# Patient Record
Sex: MALE | Race: WHITE | Employment: UNEMPLOYED | URBAN - METROPOLITAN AREA
[De-identification: names, ages, dates, MRNs, and addresses within clinical notes are randomized per-mention and may not be internally consistent; named-entity substitution may affect disease eponyms.]

---

## 2021-11-22 NOTE — PROGRESS NOTES
[facility] ([facility]) Joint Replacement Pre-surgical Assessment    Scheduled Surgery Date: 12/02/2021  Surgery Time: 0730    Surgeon: HEIDE  Procedure: left Total Hip    Primary Insurance Coverage PARAMOUNT ADVANTAGE  Pre-op class attended YES 11/18/2021    PCP: Avery Bolaños MD  Clearance received by PCP: YES    Anticipated Discharge Plan: home  Agency (if applicable): HHC?     Significant PMH:   Surgical History    Procedure Laterality Date Comment Source   CARDIAC SURGERY  07/05/2015 Loop Recorder Implant    EXTERNAL AUDITORY CANAL RECONSTRUCTION       FACIAL COSMETIC SURGERY       KNEE SURGERY       WISDOM TOOTH EXTRACTION           ED Notes    ED Notes      Medical History    Diagnosis Date Comment Source   Arthritis  exercise induced    Dizzy      Infection of knee end of June 2015 left knee treated with IV antibiotics    Syncope      Vasovagal syndrome  per tilt test             Smoking history: none    Alcohol history: Never drinks    Concerns prior to surgery: [address]    Electronically signed by: Darron Morse RN on 11/22/2021 at 11:12 AM

## 2021-11-26 ENCOUNTER — HOSPITAL ENCOUNTER (OUTPATIENT)
Dept: PREADMISSION TESTING | Age: 56
Discharge: HOME | End: 2021-11-30
Payer: MEDICARE

## 2021-11-26 VITALS
BODY MASS INDEX: 32.92 KG/M2 | WEIGHT: 222.3 LBS | HEIGHT: 69 IN | DIASTOLIC BLOOD PRESSURE: 88 MMHG | SYSTOLIC BLOOD PRESSURE: 150 MMHG | RESPIRATION RATE: 16 BRPM | TEMPERATURE: 97.1 F | OXYGEN SATURATION: 96 %

## 2021-11-26 LAB
ABO/RH: NORMAL
ANION GAP SERPL CALCULATED.3IONS-SCNC: 14 MMOL/L (ref 9–17)
ANTIBODY SCREEN: NEGATIVE
ARM BAND NUMBER: NORMAL
BUN BLDV-MCNC: 17 MG/DL (ref 6–20)
BUN/CREAT BLD: 19 (ref 9–20)
CALCIUM SERPL-MCNC: 9.2 MG/DL (ref 8.6–10.4)
CHLORIDE BLD-SCNC: 102 MMOL/L (ref 98–107)
CO2: 22 MMOL/L (ref 20–31)
CREAT SERPL-MCNC: 0.91 MG/DL (ref 0.7–1.2)
EXPIRATION DATE: NORMAL
GFR AFRICAN AMERICAN: >60 ML/MIN
GFR NON-AFRICAN AMERICAN: >60 ML/MIN
GFR SERPL CREATININE-BSD FRML MDRD: ABNORMAL ML/MIN/{1.73_M2}
GFR SERPL CREATININE-BSD FRML MDRD: ABNORMAL ML/MIN/{1.73_M2}
GLUCOSE BLD-MCNC: 114 MG/DL (ref 70–99)
HCT VFR BLD CALC: 38.7 % (ref 40.7–50.3)
HEMOGLOBIN: 12.9 G/DL (ref 13–17)
MCH RBC QN AUTO: 28.7 PG (ref 25.2–33.5)
MCHC RBC AUTO-ENTMCNC: 33.3 G/DL (ref 28.4–34.8)
MCV RBC AUTO: 86 FL (ref 82.6–102.9)
NRBC AUTOMATED: 0 PER 100 WBC
PDW BLD-RTO: 12.6 % (ref 11.8–14.4)
PLATELET # BLD: 184 K/UL (ref 138–453)
PMV BLD AUTO: 9.2 FL (ref 8.1–13.5)
POTASSIUM SERPL-SCNC: 4.3 MMOL/L (ref 3.7–5.3)
RBC # BLD: 4.5 M/UL (ref 4.21–5.77)
SODIUM BLD-SCNC: 138 MMOL/L (ref 135–144)
WBC # BLD: 5 K/UL (ref 3.5–11.3)

## 2021-11-26 PROCEDURE — 87641 MR-STAPH DNA AMP PROBE: CPT

## 2021-11-26 PROCEDURE — 36415 COLL VENOUS BLD VENIPUNCTURE: CPT

## 2021-11-26 PROCEDURE — 80048 BASIC METABOLIC PNL TOTAL CA: CPT

## 2021-11-26 PROCEDURE — 85027 COMPLETE CBC AUTOMATED: CPT

## 2021-11-26 PROCEDURE — 86850 RBC ANTIBODY SCREEN: CPT

## 2021-11-26 PROCEDURE — 93005 ELECTROCARDIOGRAM TRACING: CPT | Performed by: ANESTHESIOLOGY

## 2021-11-26 PROCEDURE — 86901 BLOOD TYPING SEROLOGIC RH(D): CPT

## 2021-11-26 PROCEDURE — 86900 BLOOD TYPING SEROLOGIC ABO: CPT

## 2021-11-26 RX ORDER — LISINOPRIL AND HYDROCHLOROTHIAZIDE 20; 12.5 MG/1; MG/1
2 TABLET ORAL DAILY
COMMUNITY
Start: 2021-11-10

## 2021-11-26 RX ORDER — ASPIRIN 81 MG/1
81 TABLET, COATED ORAL DAILY
Status: ON HOLD | COMMUNITY
Start: 2021-10-20 | End: 2021-12-02 | Stop reason: HOSPADM

## 2021-11-26 RX ORDER — SCOLOPAMINE TRANSDERMAL SYSTEM 1 MG/1
1 PATCH, EXTENDED RELEASE TRANSDERMAL ONCE
Status: CANCELLED | OUTPATIENT
Start: 2021-12-02

## 2021-11-26 RX ORDER — GEMFIBROZIL 600 MG/1
600 TABLET, FILM COATED ORAL DAILY
COMMUNITY
Start: 2021-10-20

## 2021-11-26 RX ORDER — MELOXICAM 15 MG/1
15 TABLET ORAL DAILY
Status: ON HOLD | COMMUNITY
Start: 2021-11-13 | End: 2021-12-02 | Stop reason: HOSPADM

## 2021-11-26 RX ORDER — DEXAMETHASONE SODIUM PHOSPHATE 10 MG/ML
10 INJECTION, SOLUTION INTRAMUSCULAR; INTRAVENOUS ONCE
Status: CANCELLED | OUTPATIENT
Start: 2021-12-02

## 2021-11-26 ASSESSMENT — HOOS JR
WALKING ON UNEVEN SURFACE: 2
SITTING: 2
HOOS JR RAW SCORE: 14
GOING UP OR DOWN STAIRS: 3
LYING IN BED (TURNING OVER, MAINTAINING HIP POSITION): 3
BENDING TO THE FLOOR TO PICK UP OBJECT: 2
RISING FROM SITTING: 2

## 2021-11-26 ASSESSMENT — PROMIS GLOBAL HEALTH SCALE
IN THE PAST 7 DAYS, HOW WOULD YOU RATE YOUR PAIN ON AVERAGE [ON A SCALE FROM 0 (NO PAIN) TO 10 (WORST IMAGINABLE PAIN)]?: 6
SUM OF RESPONSES TO QUESTIONS 2, 4, 5, & 10: 13
IN GENERAL, HOW WOULD YOU RATE YOUR PHYSICAL HEALTH [ON A SCALE OF 1 (POOR) TO 5 (EXCELLENT)]?: 3
IN GENERAL, WOULD YOU SAY YOUR HEALTH IS...[ON A SCALE OF 1 (POOR) TO 5 (EXCELLENT)]: 3
WHO IS THE PERSON COMPLETING THE PROMIS V1.1 SURVEY?: 0
IN THE PAST 7 DAYS, HOW OFTEN HAVE YOU BEEN BOTHERED BY EMOTIONAL PROBLEMS, SUCH AS FEELING ANXIOUS, DEPRESSED, OR IRRITABLE [ON A SCALE FROM 1 (NEVER) TO 5 (ALWAYS)]?: 2
TO WHAT EXTENT ARE YOU ABLE TO CARRY OUT YOUR EVERYDAY PHYSICAL ACTIVITIES SUCH AS WALKING, CLIMBING STAIRS, CARRYING GROCERIES, OR MOVING A CHAIR [ON A SCALE OF 1 (NOT AT ALL) TO 5 (COMPLETELY)]?: 3
SUM OF RESPONSES TO QUESTIONS 3, 6, 7, & 8: 15
HOW IS THE PROMIS V1.1 BEING ADMINISTERED?: 0
IN GENERAL, PLEASE RATE HOW WELL YOU CARRY OUT YOUR USUAL SOCIAL ACTIVITIES (INCLUDES ACTIVITIES AT HOME, AT WORK, AND IN YOUR COMMUNITY, AND RESPONSIBILITIES AS A PARENT, CHILD, SPOUSE, EMPLOYEE, FRIEND, ETC) [ON A SCALE OF 1 (POOR) TO 5 (EXCELLENT)]?: 4
IN THE PAST 7 DAYS, HOW WOULD YOU RATE YOUR FATIGUE ON AVERAGE [ON A SCALE FROM 1 (NONE) TO 5 (VERY SEVERE)]?: 3
IN GENERAL, HOW WOULD YOU RATE YOUR SATISFACTION WITH YOUR SOCIAL ACTIVITIES AND RELATIONSHIPS [ON A SCALE OF 1 (POOR) TO 5 (EXCELLENT)]?: 4
IN GENERAL, WOULD YOU SAY YOUR QUALITY OF LIFE IS...[ON A SCALE OF 1 (POOR) TO 5 (EXCELLENT)]: 3
IN GENERAL, HOW WOULD YOU RATE YOUR MENTAL HEALTH, INCLUDING YOUR MOOD AND YOUR ABILITY TO THINK [ON A SCALE OF 1 (POOR) TO 5 (EXCELLENT)]?: 4

## 2021-11-26 ASSESSMENT — PAIN DESCRIPTION - LOCATION: LOCATION: HIP

## 2021-11-26 ASSESSMENT — PAIN DESCRIPTION - ORIENTATION: ORIENTATION: LEFT

## 2021-11-26 ASSESSMENT — PAIN DESCRIPTION - DESCRIPTORS: DESCRIPTORS: CONSTANT;DISCOMFORT

## 2021-11-26 ASSESSMENT — PAIN SCALES - GENERAL: PAINLEVEL_OUTOF10: 5

## 2021-11-26 ASSESSMENT — PAIN DESCRIPTION - PAIN TYPE: TYPE: CHRONIC PAIN

## 2021-11-26 NOTE — H&P
History and Physical Service   [facility]    HISTORY AND PHYSICAL EXAMINATION            Date of Evaluation: 11/26/2021  Patient name:  Ro Subramanian  MRN:   [id number]  YOB: 1965  PCP:    Tiffanie Rendon MD    History Obtained From:     Patient    History of Present Illness: This is Ro Subramanian a 60 y.o. male who presents for a pre-admission testing appointment for an upcoming left total hip arthroplasty anterior approach by Dr Sheila Garcia for left hip OA scheduled on 12/2/21 @ 0730. The patient's chief complaint is 5+/10 constant aching left hip pain which has progressively worsened over the past several years. His pain is aggravated by standing, walking, and climbing stairs. Follows with Dr Kate Chaves. He had three left hip injections over a year that provided only temporary relief and the last  on 8/20/21 provided no relief at all. Pain now radiating to his left groin and is minimally relieved with Mobic. When last seen on 9/29/21, Dr Kate Chaves reviewed the Left Hip MRI again from 1/31/20 that showed \"bilateral hip OA moderate, L> R. Has a tear of left acetabular labrum is suspected with posterior lateral paralabral cyst.\"   Dr Kate Chaves felt the patient has failed prior conservative treatment including PT and recommended surgical intervention. Denies recent falls and injuries. Hx HTN HLD on medication \"Vasodpressor syncope\" Followed with Dr Felecia Hazel Had a loop recorder implanted. \"When I had my f/u it was going to remove it but they decided to keep it in place\" Hasn't seen cardiology for 1-2 years ago. Sleep apnea questionnaire  Never had a sleep study but has CPAP available. Didn't like using it    1) Do you snore loudly? Yes  2) Do you often feel tired, fatigued, or sleepy? sometimes  3) Has anyone observed you stop breathing or choking/gasping during your sleep? Yes   4) Do you have hypertension? Yes  5) BMI >35 kg/m2? No  6) Age > 50? Yes  7) Pt is a male? Yes    Functional Status per patient:  Can perform these activities but due to hip pain must go slowly and doesn't have any symptoms   1) Patient is able to walk 2 blocks or more on level ground without SOB. 2) Patient is able to climb 1 flight of stairs slowly or more without stopping. 3) Patient is able to walk slowly up a hill 1 block or more. Past Medical History:     Past Medical History:   Diagnosis Date    Arthritis     exercise induced    Asthma     exercise induced asthma    COVID-19 11/2021    Dizzy     Hyperlipidemia     Hypertension     Infection of knee end of June 2015    left knee treated with IV antibiotics    Shingles     2019    Syncope     Vasovagal syndrome     per tilt test        Past Surgical History:     Past Surgical History:   Procedure Laterality Date    CARDIAC SURGERY  07/05/2015    Loop Recorder Implant    EXTERNAL AUDITORY CANAL RECONSTRUCTION      FACIAL COSMETIC SURGERY      Pt denies    KNEE SURGERY      WISDOM TOOTH EXTRACTION          Medications Prior to Admission:     Prior to Admission medications    Medication Sig Start Date End Date Taking? Authorizing Provider   citalopram (CELEXA) 20 MG tablet Take 20 mg by mouth daily   Yes Historical Provider, MD   ASPIRIN LOW DOSE 81 MG EC tablet Take 81 mg by mouth daily 10/20/21   Historical Provider, MD   gemfibrozil (LOPID) 600 MG tablet Take 600 mg by mouth daily 10/20/21   Historical Provider, MD   lisinopril-hydroCHLOROthiazide (PRINZIDE;ZESTORETIC) 20-12.5 MG per tablet Take 2 tablets by mouth daily 11/10/21   Historical Provider, MD   meloxicam (MOBIC) 15 MG tablet Take 15 mg by mouth daily 11/13/21   Historical Provider, MD   loratadine (CLARITIN) 10 MG tablet Take 10 mg by mouth daily as needed    Historical Provider, MD        Allergies:     Patient has no known allergies. Social History:     Tobacco:    reports that he has quit smoking. His smoking use included cigars.  He has never used smokeless tobacco.  Alcohol:      reports current alcohol use. Drug Use:  reports no history of drug use. Family History:     Family History   Problem Relation Age of Onset    Coronary Art Dis Mother     Cancer Father         Lung cancer       Review of Systems:     Positive and Negative as described in HPI. CONSTITUTIONAL: intentional 30# weight loss Negative for fevers, chills, sweats, fatigue  HEENT:  Negative for glasses, hearing changes, rhinorrhea, and throat pain. RESPIRATORY: Hx exercise induced asthma No use of inhalers Negative for shortness of breath, cough, congestion, and wheezing. CARDIOVASCULAR: Hx HTN HLD on medication \"Vasodpressor syncope\" Followed with Dr Sesay Monday Had a loop recorder implanted. \"When had my f/u it was going to remove it but they decided to keep it in place\" Hasn't seen cardiology for 1-2 years ago. Negative for chest pain, blood clot, irregular heartbeat, and palpitations. GASTROINTESTINAL:  Negative for reflux, nausea, vomiting, diarrhea, constipation, change in bowel habits, and abdominal pain. GENITOURINARY:  Negative for difficulty of urination, burning with urination, and frequency. INTEGUMENT:  Negative for rash, skin lesions, and easy bruising. Instructed pt to call PCP as soon as possible if a rash or wound develops prior to surgery. Pt voiced understanding. HEMATOLOGIC/LYMPHATIC:  Negative for swelling/edema. ALLERGIC/IMMUNOLOGIC:  Negative for urticaria and itching. ENDOCRINE:  Negative for increase in thirst, increase in urination, and heat or cold intolerance. MUSCULOSKELETAL: See HPI. NEUROLOGICAL:  Negative for headaches, dizziness, lightheadedness, numbness, and tingling extremities. BEHAVIOR/PSYCH:  Negative for depression and anxiety.     Physical Exam:   BP (!) 150/88   Temp 97.1 °F (36.2 °C) (Temporal)   Resp 16   Ht 5' 9\" (1.753 m)   Wt 222 lb 4.8 oz (100.8 kg)   SpO2 96%   BMI 32.83 kg/m²      General Appearance:  Alert, well appearing, and GFR Comment          GFR Staging NOT REPORTED    TYPE AND SCREEN    Collection Time: 11/26/21 10:26 AM   Result Value Ref Range    Expiration Date 12/05/2021,2359     Arm Band Number [id number]     ABO/Rh B POSITIVE     Antibody Screen NEGATIVE        Recent Labs     11/26/21  1024   HGB 12.9*   HCT 38.7*   WBC 5.0   MCV 86.0      K 4.3      CO2 22   BUN 17   CREATININE 0.91   GLUCOSE 114*       No results for input(s): COVID19 in the last 720 hours. Imaging/Diagnostics:    No results found. EKG: See Epic. Currently showed NSR with inferior infarct age undetermined    Diagnosis:      1. Left hip OA    Plans:     1.  Left total hip arthroplasty anterior approach      THI Lucero - KIMBER  11/26/2021  2:28 PM

## 2021-11-26 NOTE — PROGRESS NOTES
[facility] at 0530 AM    Once you enter the hospital lobby, take the elevators to the second floor. Check-In is at the surgery registration desk. Continue to take your home medications as you normally do up to and including the night before surgery with the exception of any blood thinning medications. Please stop any blood thinning medications as directed by your surgeon or prescribing physician. Failure to stop certain medications may interfere with your scheduled surgery. These may include:  Aspirin, Warfarin (Coumadin), Clopidogrel (Plavix), Ibuprofen (Motrin, Advil), Naproxen (Aleve), Meloxicam (Mobic), Celecoxib (Celebrex), Eliquis, Pradaxa, Xarelto, Effient, Fish Oil, Herbal supplements. Do not take Aspirin or Mobic 7 days prior to surgery. Ok to take Tylenol    Please take the following medication(s) the day of surgery with a small sip of water:  Celexa    PREPARING FOR YOUR SURGERY:     Before surgery, you can play an important role in your own health. Because skin is not sterile, we need to be sure that your skin is as free of germs as possible before surgery by carefully washing before surgery. Preparing or prepping skin before surgery can reduce the risk of a surgical site infection.   Do not shave the area of your body where your surgery will be performed unless you received specific permission from your physician. You will need to shower at home the night before surgery and the morning of surgery with a special soap called chlorhexidine gluconate (CHG*). *Not to be used by people allergic to Chlorhexidine Gluconate (CHG). Following these instructions will help you be sure that your skin is clean before surgery. Instructions on cleaning your skin before surgery: The night before your surgery:      You will need to shower with warm water (not hot) and the CHG soap.  Use a clean wash cloth and a clean towel.   Have clean clothes available to put on after the shower.   First wash your hair with regular shampoo. Rinse your hair and body thoroughly to remove the shampoo.  Wash your face and genital area (private parts) with your regular soap or water only. Thoroughly rinse your body with warm water from the neck down.  Turn water off to prevent rinsing the soap off too soon.  With a clean wet washcloth and half of the CHG soap in the bottle, lather your entire body from the neck down. Do not use CHG soap near your eyes or ears to avoid injury to those areas.  Wash thoroughly, paying special attention to the area where your surgery will be performed.  Wash your body gently for five (5) minutes. Avoid scrubbing your skin too hard.  Turn the water back on and rinse your body thoroughly.  Pat yourself dry with a clean, soft towel. Do not apply lotion, cream or powder.  Dress with clean freshly washed clothes. The morning of surgery:     Repeat shower following steps above - using remaining half of CHG soap in bottle. Patient Instructions:    Annabella Heller If you are having any type of anesthesia you are to have nothing to eat or drink after midnight the night before your surgery. This includes gum, hard candy, mints, water or smoking or chewing tobacco.  The only exception to this is a small sip of water to take with any morning dose of heart, blood pressure, or seizure medications. No alcoholic beverages for 24 hours prior to surgery.  Brush your teeth but do not swallow water.  Bring your eye glasses and case with you. No contacts are to be worn the day of surgery. You also may bring your hearing aids. Most surgical procedures involving anesthesia will require that you remove your dentures prior to surgery.  If you are on C-PAP or Bi-PAP at home and plan on staying in the hospital overnight for your surgery please bring the machine with you.     · Do not wear any jewelry or body piercings day of surgery. Also, NO lotion, perfume or deodorant to be used the day of surgery. No nail polish on the operative extremity (arm/leg surgeries)    · If you are staying overnight with us, please bring a small bag of necessary personal items.  Please wear loose, comfortable clothing. If you are potentially going to have a cast or brace bring clothing that will fit over them.  In case of illness - If you have cold or flu like symptoms (high fever, runny nose, sore throat, cough, etc.) rash, nausea, vomiting, loose stools, and/or recent contact with someone who has a contagious disease (chicken pox, measles, etc.) Please call your doctor before coming to the hospital.         Day of Surgery/Procedure:    As a patient at [facility] you can expect quality medical and nursing care that is centered on your individual needs. Our goal is to make your surgical experience as comfortable as possible    . Transportation After Your Surgery/Procedure: You will need a friend or family member to drive you home after your procedure. Your  must be 25years of age or older and able to sign off on your discharge instructions. A taxi cab or any other form of public transportation is not acceptable. Your friend or family member must stay at the hospital throughout your procedure. Someone must remain with you for the first 24 hours after your surgery if you receive anesthesia or medication. If you do not have someone to stay with you, your procedure may be cancelled.       If you have any other questions regarding your procedure or the day of surgery, please call [phone]      _________________________  ____________________________  Signature (patient)                                       Signature (provider)             Date

## 2021-11-27 LAB
EKG ATRIAL RATE: 67 BPM
EKG P AXIS: 56 DEGREES
EKG P-R INTERVAL: 156 MS
EKG Q-T INTERVAL: 396 MS
EKG QRS DURATION: 76 MS
EKG QTC CALCULATION (BAZETT): 418 MS
EKG R AXIS: -10 DEGREES
EKG T AXIS: 36 DEGREES
EKG VENTRICULAR RATE: 67 BPM
MRSA, DNA, NASAL: NORMAL
SPECIMEN DESCRIPTION: NORMAL

## 2021-11-27 PROCEDURE — 93010 ELECTROCARDIOGRAM REPORT: CPT | Performed by: INTERNAL MEDICINE

## 2021-12-01 ENCOUNTER — ANESTHESIA EVENT (OUTPATIENT)
Dept: OPERATING ROOM | Age: 56
End: 2021-12-01
Payer: MEDICARE

## 2021-12-02 ENCOUNTER — HOSPITAL ENCOUNTER (OUTPATIENT)
Age: 56
Discharge: HOME | End: 2021-12-03
Attending: ORTHOPAEDIC SURGERY | Admitting: ORTHOPAEDIC SURGERY
Payer: MEDICARE

## 2021-12-02 ENCOUNTER — APPOINTMENT (OUTPATIENT)
Dept: GENERAL RADIOLOGY | Age: 56
End: 2021-12-02
Attending: ORTHOPAEDIC SURGERY
Payer: MEDICARE

## 2021-12-02 ENCOUNTER — ANESTHESIA (OUTPATIENT)
Dept: OPERATING ROOM | Age: 56
End: 2021-12-02
Payer: MEDICARE

## 2021-12-02 VITALS — SYSTOLIC BLOOD PRESSURE: 104 MMHG | TEMPERATURE: 98.8 F | OXYGEN SATURATION: 99 % | DIASTOLIC BLOOD PRESSURE: 52 MMHG

## 2021-12-02 DIAGNOSIS — G89.18 ACUTE POSTOPERATIVE PAIN: Primary | ICD-10-CM

## 2021-12-02 PROBLEM — M16.12 PRIMARY LOCALIZED OSTEOARTHRITIS OF LEFT HIP: Status: ACTIVE | Noted: 2021-12-02

## 2021-12-02 LAB — GLUCOSE BLD-MCNC: 173 MG/DL (ref 75–110)

## 2021-12-02 PROCEDURE — 7100000011 HC PHASE II RECOVERY - ADDTL 15 MIN: Performed by: ORTHOPAEDIC SURGERY

## 2021-12-02 PROCEDURE — 2580000003 HC RX 258: Performed by: ANESTHESIOLOGY

## 2021-12-02 PROCEDURE — 3700000000 HC ANESTHESIA ATTENDED CARE: Performed by: ORTHOPAEDIC SURGERY

## 2021-12-02 PROCEDURE — 3600000005 HC SURGERY LEVEL 5 BASE: Performed by: ORTHOPAEDIC SURGERY

## 2021-12-02 PROCEDURE — C1776 JOINT DEVICE (IMPLANTABLE): HCPCS | Performed by: ORTHOPAEDIC SURGERY

## 2021-12-02 PROCEDURE — 82947 ASSAY GLUCOSE BLOOD QUANT: CPT

## 2021-12-02 PROCEDURE — 2500000003 HC RX 250 WO HCPCS: Performed by: NURSE ANESTHETIST, CERTIFIED REGISTERED

## 2021-12-02 PROCEDURE — 2580000003 HC RX 258: Performed by: ORTHOPAEDIC SURGERY

## 2021-12-02 PROCEDURE — 3600000015 HC SURGERY LEVEL 5 ADDTL 15MIN: Performed by: ORTHOPAEDIC SURGERY

## 2021-12-02 PROCEDURE — 97167 OT EVAL HIGH COMPLEX 60 MIN: CPT

## 2021-12-02 PROCEDURE — 7100000000 HC PACU RECOVERY - FIRST 15 MIN: Performed by: ORTHOPAEDIC SURGERY

## 2021-12-02 PROCEDURE — 7100000010 HC PHASE II RECOVERY - FIRST 15 MIN: Performed by: ORTHOPAEDIC SURGERY

## 2021-12-02 PROCEDURE — 97530 THERAPEUTIC ACTIVITIES: CPT

## 2021-12-02 PROCEDURE — 2709999900 HC NON-CHARGEABLE SUPPLY: Performed by: ORTHOPAEDIC SURGERY

## 2021-12-02 PROCEDURE — 97163 PT EVAL HIGH COMPLEX 45 MIN: CPT

## 2021-12-02 PROCEDURE — 6360000002 HC RX W HCPCS: Performed by: ORTHOPAEDIC SURGERY

## 2021-12-02 PROCEDURE — 6370000000 HC RX 637 (ALT 250 FOR IP): Performed by: ORTHOPAEDIC SURGERY

## 2021-12-02 PROCEDURE — 6370000000 HC RX 637 (ALT 250 FOR IP): Performed by: ANESTHESIOLOGY

## 2021-12-02 PROCEDURE — 2580000003 HC RX 258: Performed by: NURSE ANESTHETIST, CERTIFIED REGISTERED

## 2021-12-02 PROCEDURE — 3209999900 FLUORO FOR SURGICAL PROCEDURES

## 2021-12-02 PROCEDURE — 97116 GAIT TRAINING THERAPY: CPT

## 2021-12-02 PROCEDURE — 6360000002 HC RX W HCPCS: Performed by: NURSE ANESTHETIST, CERTIFIED REGISTERED

## 2021-12-02 PROCEDURE — 97535 SELF CARE MNGMENT TRAINING: CPT

## 2021-12-02 PROCEDURE — 7100000001 HC PACU RECOVERY - ADDTL 15 MIN: Performed by: ORTHOPAEDIC SURGERY

## 2021-12-02 PROCEDURE — 73502 X-RAY EXAM HIP UNI 2-3 VIEWS: CPT

## 2021-12-02 PROCEDURE — 3700000001 HC ADD 15 MINUTES (ANESTHESIA): Performed by: ORTHOPAEDIC SURGERY

## 2021-12-02 DEVICE — BONE SCREW 6.5X30 SELF-TAP: Type: IMPLANTABLE DEVICE | Site: HIP | Status: FUNCTIONAL

## 2021-12-02 DEVICE — G7 FINNED 4 HOLE SHELL 54F: Type: IMPLANTABLE DEVICE | Site: HIP | Status: FUNCTIONAL

## 2021-12-02 DEVICE — IMPLANTABLE DEVICE: Type: IMPLANTABLE DEVICE | Site: HIP | Status: FUNCTIONAL

## 2021-12-02 DEVICE — G7 NEUTRAL E1 LINER 36MM F: Type: IMPLANTABLE DEVICE | Site: HIP | Status: FUNCTIONAL

## 2021-12-02 DEVICE — STEM FEM SZ 15 L115MM 133DEG HIP PPS HI OFFSET TYP 1 TAPR: Type: IMPLANTABLE DEVICE | Site: HIP | Status: FUNCTIONAL

## 2021-12-02 RX ORDER — PROPOFOL 10 MG/ML
INJECTION, EMULSION INTRAVENOUS CONTINUOUS PRN
Status: DISCONTINUED | OUTPATIENT
Start: 2021-12-02 | End: 2021-12-02 | Stop reason: SDUPTHER

## 2021-12-02 RX ORDER — ACETAMINOPHEN 325 MG/1
650 TABLET ORAL EVERY 6 HOURS
Status: DISCONTINUED | OUTPATIENT
Start: 2021-12-02 | End: 2021-12-03 | Stop reason: HOSPADM

## 2021-12-02 RX ORDER — OXYCODONE HYDROCHLORIDE 5 MG/1
10 TABLET ORAL EVERY 4 HOURS PRN
Status: DISCONTINUED | OUTPATIENT
Start: 2021-12-02 | End: 2021-12-03 | Stop reason: HOSPADM

## 2021-12-02 RX ORDER — LABETALOL HYDROCHLORIDE 5 MG/ML
5 INJECTION, SOLUTION INTRAVENOUS EVERY 10 MIN PRN
Status: DISCONTINUED | OUTPATIENT
Start: 2021-12-02 | End: 2021-12-02 | Stop reason: HOSPADM

## 2021-12-02 RX ORDER — DEXAMETHASONE SODIUM PHOSPHATE 10 MG/ML
INJECTION INTRAMUSCULAR; INTRAVENOUS PRN
Status: DISCONTINUED | OUTPATIENT
Start: 2021-12-02 | End: 2021-12-02 | Stop reason: SDUPTHER

## 2021-12-02 RX ORDER — POLYETHYLENE GLYCOL 3350 17 G/17G
17 POWDER, FOR SOLUTION ORAL DAILY
Status: DISCONTINUED | OUTPATIENT
Start: 2021-12-02 | End: 2021-12-03 | Stop reason: HOSPADM

## 2021-12-02 RX ORDER — OXYCODONE HYDROCHLORIDE AND ACETAMINOPHEN 5; 325 MG/1; MG/1
1-2 TABLET ORAL EVERY 4 HOURS PRN
Qty: 50 TABLET | Refills: 0 | Status: SHIPPED | OUTPATIENT
Start: 2021-12-02 | End: 2021-12-09

## 2021-12-02 RX ORDER — HYDROMORPHONE HYDROCHLORIDE 1 MG/ML
0.5 INJECTION, SOLUTION INTRAMUSCULAR; INTRAVENOUS; SUBCUTANEOUS EVERY 5 MIN PRN
Status: DISCONTINUED | OUTPATIENT
Start: 2021-12-02 | End: 2021-12-02 | Stop reason: HOSPADM

## 2021-12-02 RX ORDER — PROMETHAZINE HYDROCHLORIDE 25 MG/ML
6.25 INJECTION, SOLUTION INTRAMUSCULAR; INTRAVENOUS
Status: DISCONTINUED | OUTPATIENT
Start: 2021-12-02 | End: 2021-12-02 | Stop reason: HOSPADM

## 2021-12-02 RX ORDER — LIDOCAINE HYDROCHLORIDE 10 MG/ML
1 INJECTION, SOLUTION EPIDURAL; INFILTRATION; INTRACAUDAL; PERINEURAL
Status: DISCONTINUED | OUTPATIENT
Start: 2021-12-03 | End: 2021-12-02 | Stop reason: HOSPADM

## 2021-12-02 RX ORDER — MIDAZOLAM HYDROCHLORIDE 1 MG/ML
INJECTION INTRAMUSCULAR; INTRAVENOUS PRN
Status: DISCONTINUED | OUTPATIENT
Start: 2021-12-02 | End: 2021-12-02 | Stop reason: SDUPTHER

## 2021-12-02 RX ORDER — ONDANSETRON 2 MG/ML
INJECTION INTRAMUSCULAR; INTRAVENOUS PRN
Status: DISCONTINUED | OUTPATIENT
Start: 2021-12-02 | End: 2021-12-02 | Stop reason: SDUPTHER

## 2021-12-02 RX ORDER — OXYCODONE HYDROCHLORIDE AND ACETAMINOPHEN 5; 325 MG/1; MG/1
2 TABLET ORAL PRN
Status: COMPLETED | OUTPATIENT
Start: 2021-12-02 | End: 2021-12-02

## 2021-12-02 RX ORDER — KETAMINE HCL IN NACL, ISO-OSM 100MG/10ML
SYRINGE (ML) INJECTION PRN
Status: DISCONTINUED | OUTPATIENT
Start: 2021-12-02 | End: 2021-12-02 | Stop reason: SDUPTHER

## 2021-12-02 RX ORDER — LIDOCAINE HYDROCHLORIDE 20 MG/ML
INJECTION, SOLUTION EPIDURAL; INFILTRATION; INTRACAUDAL; PERINEURAL PRN
Status: DISCONTINUED | OUTPATIENT
Start: 2021-12-02 | End: 2021-12-02 | Stop reason: SDUPTHER

## 2021-12-02 RX ORDER — OXYCODONE HYDROCHLORIDE 5 MG/1
5 TABLET ORAL EVERY 4 HOURS PRN
Status: DISCONTINUED | OUTPATIENT
Start: 2021-12-02 | End: 2021-12-03 | Stop reason: HOSPADM

## 2021-12-02 RX ORDER — SODIUM CHLORIDE 0.9 % (FLUSH) 0.9 %
5-40 SYRINGE (ML) INJECTION EVERY 12 HOURS SCHEDULED
Status: DISCONTINUED | OUTPATIENT
Start: 2021-12-02 | End: 2021-12-03 | Stop reason: HOSPADM

## 2021-12-02 RX ORDER — DEXAMETHASONE SODIUM PHOSPHATE 10 MG/ML
10 INJECTION, SOLUTION INTRAMUSCULAR; INTRAVENOUS ONCE
Status: DISCONTINUED | OUTPATIENT
Start: 2021-12-02 | End: 2021-12-02 | Stop reason: HOSPADM

## 2021-12-02 RX ORDER — CITALOPRAM 20 MG/1
40 TABLET ORAL DAILY
Status: DISCONTINUED | OUTPATIENT
Start: 2021-12-02 | End: 2021-12-03 | Stop reason: HOSPADM

## 2021-12-02 RX ORDER — SODIUM CHLORIDE 0.9 % (FLUSH) 0.9 %
10 SYRINGE (ML) INJECTION PRN
Status: DISCONTINUED | OUTPATIENT
Start: 2021-12-02 | End: 2021-12-02 | Stop reason: HOSPADM

## 2021-12-02 RX ORDER — SODIUM CHLORIDE 9 MG/ML
25 INJECTION, SOLUTION INTRAVENOUS PRN
Status: DISCONTINUED | OUTPATIENT
Start: 2021-12-02 | End: 2021-12-03 | Stop reason: HOSPADM

## 2021-12-02 RX ORDER — ONDANSETRON 4 MG/1
4 TABLET, ORALLY DISINTEGRATING ORAL EVERY 8 HOURS PRN
Status: DISCONTINUED | OUTPATIENT
Start: 2021-12-02 | End: 2021-12-03 | Stop reason: HOSPADM

## 2021-12-02 RX ORDER — SCOLOPAMINE TRANSDERMAL SYSTEM 1 MG/1
1 PATCH, EXTENDED RELEASE TRANSDERMAL ONCE
Status: DISCONTINUED | OUTPATIENT
Start: 2021-12-02 | End: 2021-12-03 | Stop reason: HOSPADM

## 2021-12-02 RX ORDER — ONDANSETRON 2 MG/ML
4 INJECTION INTRAMUSCULAR; INTRAVENOUS EVERY 6 HOURS PRN
Status: DISCONTINUED | OUTPATIENT
Start: 2021-12-02 | End: 2021-12-03 | Stop reason: HOSPADM

## 2021-12-02 RX ORDER — FENTANYL CITRATE 50 UG/ML
25 INJECTION, SOLUTION INTRAMUSCULAR; INTRAVENOUS EVERY 5 MIN PRN
Status: DISCONTINUED | OUTPATIENT
Start: 2021-12-02 | End: 2021-12-02 | Stop reason: HOSPADM

## 2021-12-02 RX ORDER — ASPIRIN 325 MG
325 TABLET, DELAYED RELEASE (ENTERIC COATED) ORAL 2 TIMES DAILY
Qty: 56 TABLET | Refills: 0 | Status: SHIPPED | OUTPATIENT
Start: 2021-12-02 | End: 2021-12-30

## 2021-12-02 RX ORDER — HYDRALAZINE HYDROCHLORIDE 20 MG/ML
5 INJECTION INTRAMUSCULAR; INTRAVENOUS EVERY 10 MIN PRN
Status: DISCONTINUED | OUTPATIENT
Start: 2021-12-02 | End: 2021-12-02 | Stop reason: HOSPADM

## 2021-12-02 RX ORDER — TRANEXAMIC ACID 100 MG/ML
INJECTION, SOLUTION INTRAVENOUS PRN
Status: DISCONTINUED | OUTPATIENT
Start: 2021-12-02 | End: 2021-12-02 | Stop reason: SDUPTHER

## 2021-12-02 RX ORDER — SODIUM CHLORIDE 9 MG/ML
25 INJECTION, SOLUTION INTRAVENOUS PRN
Status: DISCONTINUED | OUTPATIENT
Start: 2021-12-02 | End: 2021-12-02 | Stop reason: HOSPADM

## 2021-12-02 RX ORDER — LISINOPRIL AND HYDROCHLOROTHIAZIDE 20; 12.5 MG/1; MG/1
2 TABLET ORAL DAILY
Status: DISCONTINUED | OUTPATIENT
Start: 2021-12-02 | End: 2021-12-03 | Stop reason: HOSPADM

## 2021-12-02 RX ORDER — GEMFIBROZIL 600 MG/1
600 TABLET, FILM COATED ORAL 2 TIMES DAILY
Status: DISCONTINUED | OUTPATIENT
Start: 2021-12-02 | End: 2021-12-03 | Stop reason: HOSPADM

## 2021-12-02 RX ORDER — ONDANSETRON 4 MG/1
4 TABLET, FILM COATED ORAL EVERY 6 HOURS PRN
Qty: 30 TABLET | Refills: 1 | Status: SHIPPED | OUTPATIENT
Start: 2021-12-02

## 2021-12-02 RX ORDER — GLYCOPYRROLATE 1 MG/5 ML
SYRINGE (ML) INTRAVENOUS
Status: DISPENSED
Start: 2021-12-02 | End: 2021-12-03

## 2021-12-02 RX ORDER — OXYCODONE HYDROCHLORIDE AND ACETAMINOPHEN 5; 325 MG/1; MG/1
1 TABLET ORAL PRN
Status: COMPLETED | OUTPATIENT
Start: 2021-12-02 | End: 2021-12-02

## 2021-12-02 RX ORDER — CETIRIZINE HYDROCHLORIDE 10 MG/1
10 TABLET ORAL DAILY
Status: DISCONTINUED | OUTPATIENT
Start: 2021-12-02 | End: 2021-12-03 | Stop reason: HOSPADM

## 2021-12-02 RX ORDER — SODIUM CHLORIDE, SODIUM LACTATE, POTASSIUM CHLORIDE, CALCIUM CHLORIDE 600; 310; 30; 20 MG/100ML; MG/100ML; MG/100ML; MG/100ML
INJECTION, SOLUTION INTRAVENOUS CONTINUOUS
Status: DISCONTINUED | OUTPATIENT
Start: 2021-12-03 | End: 2021-12-02

## 2021-12-02 RX ORDER — SODIUM CHLORIDE, SODIUM LACTATE, POTASSIUM CHLORIDE, CALCIUM CHLORIDE 600; 310; 30; 20 MG/100ML; MG/100ML; MG/100ML; MG/100ML
INJECTION, SOLUTION INTRAVENOUS CONTINUOUS PRN
Status: DISCONTINUED | OUTPATIENT
Start: 2021-12-02 | End: 2021-12-02 | Stop reason: SDUPTHER

## 2021-12-02 RX ORDER — SODIUM CHLORIDE 0.9 % (FLUSH) 0.9 %
10 SYRINGE (ML) INJECTION EVERY 12 HOURS SCHEDULED
Status: DISCONTINUED | OUTPATIENT
Start: 2021-12-02 | End: 2021-12-02 | Stop reason: HOSPADM

## 2021-12-02 RX ORDER — SODIUM CHLORIDE 9 MG/ML
INJECTION, SOLUTION INTRAVENOUS CONTINUOUS
Status: DISCONTINUED | OUTPATIENT
Start: 2021-12-03 | End: 2021-12-02

## 2021-12-02 RX ORDER — SODIUM CHLORIDE 0.9 % (FLUSH) 0.9 %
5-40 SYRINGE (ML) INJECTION PRN
Status: DISCONTINUED | OUTPATIENT
Start: 2021-12-02 | End: 2021-12-03 | Stop reason: HOSPADM

## 2021-12-02 RX ORDER — TRANEXAMIC ACID 100 MG/ML
INJECTION, SOLUTION INTRAVENOUS
Status: COMPLETED
Start: 2021-12-02 | End: 2021-12-02

## 2021-12-02 RX ORDER — BUPIVACAINE HYDROCHLORIDE 7.5 MG/ML
INJECTION, SOLUTION INTRASPINAL PRN
Status: DISCONTINUED | OUTPATIENT
Start: 2021-12-02 | End: 2021-12-02 | Stop reason: SDUPTHER

## 2021-12-02 RX ORDER — HYDROXYZINE HYDROCHLORIDE 10 MG/1
10 TABLET, FILM COATED ORAL EVERY 8 HOURS PRN
Status: DISCONTINUED | OUTPATIENT
Start: 2021-12-02 | End: 2021-12-03 | Stop reason: HOSPADM

## 2021-12-02 RX ORDER — DOCUSATE SODIUM 100 MG/1
100 CAPSULE, LIQUID FILLED ORAL 2 TIMES DAILY
Qty: 30 CAPSULE | Refills: 0 | Status: SHIPPED | OUTPATIENT
Start: 2021-12-02

## 2021-12-02 RX ORDER — ONDANSETRON 2 MG/ML
4 INJECTION INTRAMUSCULAR; INTRAVENOUS
Status: DISCONTINUED | OUTPATIENT
Start: 2021-12-02 | End: 2021-12-02 | Stop reason: HOSPADM

## 2021-12-02 RX ORDER — SODIUM CHLORIDE 9 MG/ML
INJECTION, SOLUTION INTRAVENOUS CONTINUOUS
Status: DISCONTINUED | OUTPATIENT
Start: 2021-12-02 | End: 2021-12-03 | Stop reason: HOSPADM

## 2021-12-02 RX ADMIN — MIDAZOLAM 2 MG: 1 INJECTION INTRAMUSCULAR; INTRAVENOUS at 07:24

## 2021-12-02 RX ADMIN — ACETAMINOPHEN 650 MG: 325 TABLET ORAL at 21:36

## 2021-12-02 RX ADMIN — OXYCODONE AND ACETAMINOPHEN 2 TABLET: 5; 325 TABLET ORAL at 15:14

## 2021-12-02 RX ADMIN — ONDANSETRON 4 MG: 2 INJECTION, SOLUTION INTRAMUSCULAR; INTRAVENOUS at 10:08

## 2021-12-02 RX ADMIN — LIDOCAINE HYDROCHLORIDE 100 MG: 20 INJECTION, SOLUTION EPIDURAL; INFILTRATION; INTRACAUDAL; PERINEURAL at 07:43

## 2021-12-02 RX ADMIN — TRANEXAMIC ACID 1000 MG: 100 INJECTION, SOLUTION INTRAVENOUS at 09:53

## 2021-12-02 RX ADMIN — TRANEXAMIC ACID 1000 MG: 100 INJECTION, SOLUTION INTRAVENOUS at 07:56

## 2021-12-02 RX ADMIN — DEXAMETHASONE SODIUM PHOSPHATE 10 MG: 10 INJECTION INTRAMUSCULAR; INTRAVENOUS at 07:43

## 2021-12-02 RX ADMIN — CEFAZOLIN 2000 MG: 10 INJECTION, POWDER, FOR SOLUTION INTRAVENOUS at 07:45

## 2021-12-02 RX ADMIN — OXYCODONE 10 MG: 5 TABLET ORAL at 21:36

## 2021-12-02 RX ADMIN — ACETAMINOPHEN 650 MG: 325 TABLET ORAL at 16:44

## 2021-12-02 RX ADMIN — CITALOPRAM HYDROBROMIDE 40 MG: 20 TABLET ORAL at 16:44

## 2021-12-02 RX ADMIN — BUPIVACAINE HYDROCHLORIDE IN DEXTROSE 15 MG: 7.5 INJECTION, SOLUTION SUBARACHNOID at 07:37

## 2021-12-02 RX ADMIN — Medication 325 MG: at 21:36

## 2021-12-02 RX ADMIN — Medication 30 MG: at 07:55

## 2021-12-02 RX ADMIN — SODIUM CHLORIDE, POTASSIUM CHLORIDE, SODIUM LACTATE AND CALCIUM CHLORIDE: 600; 310; 30; 20 INJECTION, SOLUTION INTRAVENOUS at 10:15

## 2021-12-02 RX ADMIN — SODIUM CHLORIDE, POTASSIUM CHLORIDE, SODIUM LACTATE AND CALCIUM CHLORIDE: 600; 310; 30; 20 INJECTION, SOLUTION INTRAVENOUS at 06:14

## 2021-12-02 RX ADMIN — POLYETHYLENE GLYCOL 3350 17 G: 17 POWDER, FOR SOLUTION ORAL at 17:03

## 2021-12-02 RX ADMIN — OXYCODONE 10 MG: 5 TABLET ORAL at 17:34

## 2021-12-02 RX ADMIN — GEMFIBROZIL 600 MG: 600 TABLET ORAL at 17:03

## 2021-12-02 RX ADMIN — SODIUM CHLORIDE: 9 INJECTION, SOLUTION INTRAVENOUS at 16:26

## 2021-12-02 RX ADMIN — PROPOFOL 70 MCG/KG/MIN: 10 INJECTION, EMULSION INTRAVENOUS at 09:28

## 2021-12-02 RX ADMIN — LISINOPRIL AND HYDROCHLOROTHIAZIDE 2 TABLET: 12.5; 2 TABLET ORAL at 16:44

## 2021-12-02 RX ADMIN — PROPOFOL 100 MCG/KG/MIN: 10 INJECTION, EMULSION INTRAVENOUS at 07:43

## 2021-12-02 RX ADMIN — Medication 20 MG: at 07:43

## 2021-12-02 RX ADMIN — CEFAZOLIN SODIUM 2000 MG: 10 INJECTION, POWDER, FOR SOLUTION INTRAVENOUS at 17:05

## 2021-12-02 RX ADMIN — SODIUM CHLORIDE, POTASSIUM CHLORIDE, SODIUM LACTATE AND CALCIUM CHLORIDE: 600; 310; 30; 20 INJECTION, SOLUTION INTRAVENOUS at 07:24

## 2021-12-02 ASSESSMENT — PULMONARY FUNCTION TESTS
PIF_VALUE: 1
PIF_VALUE: 0
PIF_VALUE: 1
PIF_VALUE: 0
PIF_VALUE: 1

## 2021-12-02 ASSESSMENT — PAIN DESCRIPTION - ORIENTATION
ORIENTATION: LEFT

## 2021-12-02 ASSESSMENT — PAIN SCALES - GENERAL
PAINLEVEL_OUTOF10: 7
PAINLEVEL_OUTOF10: 0
PAINLEVEL_OUTOF10: 3
PAINLEVEL_OUTOF10: 7
PAINLEVEL_OUTOF10: 0
PAINLEVEL_OUTOF10: 6
PAINLEVEL_OUTOF10: 6

## 2021-12-02 ASSESSMENT — PAIN DESCRIPTION - PAIN TYPE
TYPE: SURGICAL PAIN

## 2021-12-02 ASSESSMENT — PAIN DESCRIPTION - FREQUENCY
FREQUENCY: CONTINUOUS
FREQUENCY: CONTINUOUS

## 2021-12-02 ASSESSMENT — PAIN DESCRIPTION - ONSET
ONSET: ON-GOING
ONSET: ON-GOING

## 2021-12-02 ASSESSMENT — PAIN - FUNCTIONAL ASSESSMENT
PAIN_FUNCTIONAL_ASSESSMENT: PREVENTS OR INTERFERES SOME ACTIVE ACTIVITIES AND ADLS
PAIN_FUNCTIONAL_ASSESSMENT: PREVENTS OR INTERFERES SOME ACTIVE ACTIVITIES AND ADLS
PAIN_FUNCTIONAL_ASSESSMENT: 0-10

## 2021-12-02 ASSESSMENT — PAIN DESCRIPTION - DESCRIPTORS
DESCRIPTORS: ACHING

## 2021-12-02 ASSESSMENT — PAIN DESCRIPTION - LOCATION
LOCATION: HIP

## 2021-12-02 ASSESSMENT — PAIN DESCRIPTION - PROGRESSION
CLINICAL_PROGRESSION: NOT CHANGED
CLINICAL_PROGRESSION: GRADUALLY WORSENING

## 2021-12-02 NOTE — PLAN OF CARE
Problem: Falls - Risk of:  Goal: Will remain free from falls  Description: Will remain free from falls  Outcome: Ongoing  Goal: Absence of physical injury  Description: Absence of physical injury  Outcome: Ongoing     Problem: Pain:  Description: Pain management should include both nonpharmacologic and pharmacologic interventions. Goal: Pain level will decrease  Description: Pain level will decrease  Outcome: Ongoing  Goal: Control of acute pain  Description: Control of acute pain  Outcome: Ongoing  Goal: Control of chronic pain  Description: Control of chronic pain  Outcome: Ongoing     Problem: SAFETY  Goal: Free from accidental physical injury  Outcome: Ongoing  Note: Siderails up x 2  Hourly rounding  Call light in reach  Instructed to call for assist before attempting out of bed. Remains free from falls and accidental injury at this time   Floor free from obstacles  Bed is locked and in lowest position  Adequate lighting provided  Bed alarm on, Red Falling star and Stay with Me signs posted      Goal: Free from intentional harm  Outcome: Ongoing     Problem: DAILY CARE  Goal: Daily care needs are met  Outcome: Ongoing     Problem: PAIN  Goal: Patient's pain/discomfort is manageable  Outcome: Ongoing  Note: Pain level assessment complete. Patient educated on pain scale and control interventions  PRN pain medication given per patient request  Patient instructed to call out with new onset of pain or unrelieved pain       Problem: SKIN INTEGRITY  Goal: Skin integrity is maintained or improved  12/2/2021 1742 by Sandra Stevens RN  Outcome: Ongoing  12/2/2021 1741 by Sandra Stevens RN  Outcome: Met This Shift     Problem: KNOWLEDGE DEFICIT  Goal: Patient/S.O. demonstrates understanding of disease process, treatment plan, medications, and discharge instructions.   Outcome: Ongoing     Problem: DISCHARGE BARRIERS  Goal: Patient's continuum of care needs are met  Outcome: Ongoing     Problem: ABCDS Injury Assessment  Goal: Absence of physical injury  Outcome: Ongoing

## 2021-12-02 NOTE — H&P
Interval H&P Note    Pt Name: Ryan Mcneill  MRN: [id number]  [place]: 1965  Date of evaluation: 12/2/2021      [x] I have reviewed in Epic the H&P by Jordan Robledo APRN-CNP dated 11/26/2021 attached below which meets the criteria for an Interval History and Physical note. [x] I have examined  Ryan Mcneill  There are no changes to the patient who is scheduled for a left total hip arthroplasty anterior approach by Dr. Jovanny Marti for left hip osteoarthritis. The patient denies new health changes, fever, chills, wheezing, cough, increased SOB, chest pain, open sores or wounds. Denies hx diabetes. Last ASA 81mg 11/18/2021 and Meloxicam 11/25/2021. Vital signs: BP (!) 143/87   Pulse 74   Temp 97.8 °F (36.6 °C) (Temporal)   Resp 20   Ht 5' 9\" (1.753 m)   Wt 222 lb 3.6 oz (100.8 kg)   SpO2 97%   BMI 32.82 kg/m²     Allergies:  Patient has no known allergies. Medications:    Prior to Admission medications    Medication Sig Start Date End Date Taking? Authorizing Provider   lisinopril-hydroCHLOROthiazide (PRINZIDE;ZESTORETIC) 20-12.5 MG per tablet Take 2 tablets by mouth daily 11/10/21  Yes Historical Provider, MD   citalopram (CELEXA) 20 MG tablet Take 20 mg by mouth daily   Yes Historical Provider, MD   ASPIRIN LOW DOSE 81 MG EC tablet Take 81 mg by mouth daily 10/20/21   Historical Provider, MD   gemfibrozil (LOPID) 600 MG tablet Take 600 mg by mouth daily 10/20/21   Historical Provider, MD   meloxicam (MOBIC) 15 MG tablet Take 15 mg by mouth daily 11/13/21   Historical Provider, MD   loratadine (CLARITIN) 10 MG tablet Take 10 mg by mouth daily as needed    Historical Provider, MD         This is a 60 y.o. male who is pleasant, cooperative, alert and oriented x3, in no acute distress. Heart: Heart sounds are normal.  HR 74 regular rate and rhythm without murmur, gallop or rub.    Lungs: Normal respiratory effort with equal expansion, good air exchange, unlabored and clear to auscultation without wheezes or rales bilaterally   Abdomen: soft, nontender, nondistended with bowel sounds active. Labs:  Recent Labs     11/26/21  1024   HGB 12.9*   HCT 38.7*   WBC 5.0   MCV 86.0         K 4.3      CO2 22   BUN 17   CREATININE 0.91   GLUCOSE 114*       No results for input(s): COVID19 in the last 720 hours. THI Ortiz CNP  Electronically signed 12/2/2021 at 6:50 AM      Kath Cutter, APRN - CNP   Nurse Practitioner   General Surgery   H&P       Cosign Needed   Date of Service:  11/26/2021 10:00 AM         Related encounter: Pre-Admission Testing Visit from 11/26/2021 in [place] [id number] PRE-ADMIT TESTING           Cosign Needed        Expand All Collapse All        Show:Clear all  [x]Manual[x]Template[]Copied    Added by:  [x]THI Spear CNP      []Veronica for details    History and Physical Service   [facility]            Date of Evaluation:     11/26/2021  Patient name:              Anabelle Bean  MRN:                           [id number]  YOB: 1965  PCP:                            Darryle Norris, MD     History Obtained From:      Patient     History of Present Illness: This is Anabelle Bean a 60 y.o. male who presents for a pre-admission testing appointment for an upcoming left total hip arthroplasty anterior approach by Dr Steffen Manriquez for left hip OA scheduled on 12/2/21 @ 0730. The patient's chief complaint is 5+/10 constant aching left hip pain which has progressively worsened over the past several years. His pain is aggravated by standing, walking, and climbing stairs. Follows with Dr Bruno Nur. He had three left hip injections over a year that provided only temporary relief and the last  on 8/20/21 provided no relief at all. Pain now radiating to his left groin and is minimally relieved with Mobic.  When last seen on 9/29/21, Dr Bruno Nur reviewed the Left Hip MRI again from 1/31/20 that showed \"bilateral hip OA moderate, L> R. Has a tear of left acetabular labrum is suspected with posterior lateral paralabral cyst.\"   Dr Bruno Nur felt the patient has failed prior conservative treatment including PT and recommended surgical intervention. Denies recent falls and injuries. Hx HTN HLD on medication \"Vasodpressor syncope\" Followed with Dr Sesay Monday Had a loop recorder implanted. \"When I had my f/u it was going to remove it but they decided to keep it in place\" Hasn't seen cardiology for 1-2 years ago. Sleep apnea questionnaire  Never had a sleep study but has CPAP available. Didn't like using it    1) Do you snore loudly? Yes  2) Do you often feel tired, fatigued, or sleepy? sometimes  3) Has anyone observed you stop breathing or choking/gasping during your sleep? Yes   4) Do you have hypertension? Yes  5) BMI >35 kg/m2? No  6) Age > 50? Yes  7) Pt is a male? Yes     Functional Status per patient:  Can perform these activities but due to hip pain must go slowly and doesn't have any symptoms   1) Patient is able to walk 2 blocks or more on level ground without SOB. 2) Patient is able to climb 1 flight of stairs slowly or more without stopping. 3) Patient is able to walk slowly up a hill 1 block or more.      Past Medical History:      Past Medical History        Past Medical History:   Diagnosis Date    Arthritis       exercise induced    Asthma       exercise induced asthma    COVID-19 11/2021    Dizzy      Hyperlipidemia      Hypertension      Infection of knee end of June 2015     left knee treated with IV antibiotics    Shingles       2019    Syncope      Vasovagal syndrome       per tilt test            Past Surgical History:      Past Surgical History         Past Surgical History:   Procedure Laterality Date    CARDIAC SURGERY   07/05/2015     Loop Recorder Implant    EXTERNAL AUDITORY CANAL RECONSTRUCTION        FACIAL COSMETIC SURGERY         Pt denies    KNEE SURGERY        WISDOM TOOTH EXTRACTION                Medications Prior to Admission:      Home Medications           Prior to Admission medications    Medication Sig Start Date End Date Taking? Authorizing Provider   citalopram (CELEXA) 20 MG tablet Take 20 mg by mouth daily     Yes Historical Provider, MD   ASPIRIN LOW DOSE 81 MG EC tablet Take 81 mg by mouth daily 10/20/21     Historical Provider, MD   gemfibrozil (LOPID) 600 MG tablet Take 600 mg by mouth daily 10/20/21     Historical Provider, MD   lisinopril-hydroCHLOROthiazide (PRINZIDE;ZESTORETIC) 20-12.5 MG per tablet Take 2 tablets by mouth daily 11/10/21     Historical Provider, MD   meloxicam (MOBIC) 15 MG tablet Take 15 mg by mouth daily 11/13/21     Historical Provider, MD   loratadine (CLARITIN) 10 MG tablet Take 10 mg by mouth daily as needed       Historical Provider, MD            Allergies:      Patient has no known allergies. Social History:      Tobacco:    reports that he has quit smoking. His smoking use included cigars. He has never used smokeless tobacco.  Alcohol:      reports current alcohol use. Drug Use:  reports no history of drug use. Family History:      Family History         Family History   Problem Relation Age of Onset    Coronary Art Dis Mother      Cancer Father           Lung cancer            Review of Systems:      Positive and Negative as described in HPI. CONSTITUTIONAL: intentional 30# weight loss Negative for fevers, chills, sweats, fatigue  HEENT:  Negative for glasses, hearing changes, rhinorrhea, and throat pain. RESPIRATORY: Hx exercise induced asthma No use of inhalers Negative for shortness of breath, cough, congestion, and wheezing. CARDIOVASCULAR: Hx HTN HLD on medication \"Vasodpressor syncope\" Followed with Dr Terrazas Factor Had a loop recorder implanted. \"When had my f/u it was going to remove it but they decided to keep it in place\" Hasn't seen cardiology for 1-2 years ago.  Negative for chest pain, blood clot, irregular heartbeat, and palpitations. GASTROINTESTINAL:  Negative for reflux, nausea, vomiting, diarrhea, constipation, change in bowel habits, and abdominal pain. GENITOURINARY:  Negative for difficulty of urination, burning with urination, and frequency. INTEGUMENT:  Negative for rash, skin lesions, and easy bruising. Instructed pt to call PCP as soon as possible if a rash or wound develops prior to surgery. Pt voiced understanding. HEMATOLOGIC/LYMPHATIC:  Negative for swelling/edema. ALLERGIC/IMMUNOLOGIC:  Negative for urticaria and itching. ENDOCRINE:  Negative for increase in thirst, increase in urination, and heat or cold intolerance. MUSCULOSKELETAL: See HPI. NEUROLOGICAL:  Negative for headaches, dizziness, lightheadedness, numbness, and tingling extremities. BEHAVIOR/PSYCH:  Negative for depression and anxiety. Physical Exam:   BP (!) 150/88   Temp 97.1 °F (36.2 °C) (Temporal)   Resp 16   Ht 5' 9\" (1.753 m)   Wt 222 lb 4.8 oz (100.8 kg)   SpO2 96%   BMI 32.83 kg/m²       General Appearance:  Alert, well appearing, and in no acute distress. Mental status:  Oriented to person, place, and time. Head:  Normocephalic and atraumatic. Eye:  No icterus, redness, pupils equal and reactive, extraocular eye movements intact, and conjunctiva clear. Ear:  Hearing grossly intact. Nose:  No drainage noted. Mouth:  Mucous membranes moist.  Neck:  Supple and no carotid bruits noted. Lungs:  Bilateral equal air entry, unlabored at rest w/o use of accessory muscles, no wheezing, rales or rhonchi, and normal effort. Cardiovascular:  HR 68 Normal rate, regular rhythm, no murmur, gallop, or rub. Abdomen:  Soft, round, non-tender, non-distended, and active bowel sounds. Neurologic:  Normal speech and cranial nerves II through XII grossly intact. Strength 5/5 bilaterally. Skin:  No gross lesions, rashes, bruising, or bleeding on exposed skin area. Extremities: analgic gait.   Posterior tibial pulses 2+ bilaterally. No pedal edema. No calf tenderness with palpation. Psych:  Normal affect. Investigations:       Laboratory Testing:  Recent Results         Recent Results (from the past 24 hour(s))   CBC     Collection Time: 11/26/21 10:24 AM   Result Value Ref Range     WBC 5.0 3.5 - 11.3 k/uL     RBC 4.50 4.21 - 5.77 m/uL     Hemoglobin 12.9 (L) 13.0 - 17.0 g/dL     Hematocrit 38.7 (L) 40.7 - 50.3 %     MCV 86.0 82.6 - 102.9 fL     MCH 28.7 25.2 - 33.5 pg     MCHC 33.3 28.4 - 34.8 g/dL     RDW 12.6 11.8 - 14.4 %     Platelets 490 923 - 590 k/uL     MPV 9.2 8.1 - 13.5 fL     NRBC Automated 0.0 0.0 per 100 WBC   Basic Metabolic Panel     Collection Time: 11/26/21 10:24 AM   Result Value Ref Range     Glucose 114 (H) 70 - 99 mg/dL     BUN 17 6 - 20 mg/dL     CREATININE 0.91 0.70 - 1.20 mg/dL     Bun/Cre Ratio 19 9 - 20     Calcium 9.2 8.6 - 10.4 mg/dL     Sodium 138 135 - 144 mmol/L     Potassium 4.3 3.7 - 5.3 mmol/L     Chloride 102 98 - 107 mmol/L     CO2 22 20 - 31 mmol/L     Anion Gap 14 9 - 17 mmol/L     GFR Non-African American >60 >60 mL/min     GFR African American >60 >60 mL/min     GFR Comment            GFR Staging NOT REPORTED     TYPE AND SCREEN     Collection Time: 11/26/21 10:26 AM   Result Value Ref Range     Expiration Date 12/05/2021,2359       Arm Band Number [id number]       ABO/Rh B POSITIVE       Antibody Screen NEGATIVE              Recent Labs     11/26/21  1024   HGB 12.9*   HCT 38.7*   WBC 5.0   MCV 86.0      K 4.3      CO2 22   BUN 17   CREATININE 0.91   GLUCOSE 114*         No results for input(s): COVID19 in the last 720 hours. Imaging/Diagnostics:     No results found. EKG: See Epic. Currently showed NSR with inferior infarct age undetermined     Diagnosis:       1. Left hip OA     Plans:      1.  Left total hip arthroplasty anterior approach        THI Lucero - CNP  11/26/2021  2:28 PM                   Revision History Routing History

## 2021-12-02 NOTE — PROGRESS NOTES
Occupational Therapy   Occupational Therapy Initial Assessment  Date: 2021   Patient Name: Tabby Murphy  MRN: [id number]     : 1965    RN Shreyas Hadley reports patient is medically stable for therapy treatment this date. Chart reviewed prior to treatment and patient is agreeable for therapy. All lines intact and patient positioned comfortably at end of treatment. All patient needs addressed prior to ending therapy session. Date of Service: 2021    Discharge Recommendations:     OT Equipment Recommendations  Equipment Needed: Yes  Mobility Devices: ADL Assistive Devices  ADL Assistive Devices: Long-handled Shoe Horn; Long-handled Sponge; Reacher; Sock-Aid Hard; Shower Chair with back; Grab Bars - shower    Assessment   Performance deficits / Impairments: Decreased functional mobility ; Decreased ADL status; Decreased strength; Decreased endurance; Decreased high-level IADLs; Decreased balance; Decreased posture; Decreased coordination  Assessment: Skilled OT services are indicated to increase I and safety during functional tasks to return home at prior level of function as able  Decision Making: High Complexity  OT Education: OT Role; Transfer Training; Energy Conservation; Plan of Care; ADL Adaptive Strategies; Precautions; Family Education; Equipment  Patient Education: safety in function, fall prevention/call light use, benefits of being oob, recommendations for continued therapy  REQUIRES OT FOLLOW UP: Yes  Activity Tolerance  Activity Tolerance: Patient limited by fatigue; Treatment limited secondary to medical complications (free text)  Activity Tolerance: fair, limited by sycope  Safety Devices  Safety Devices in place: Yes  Type of devices: Nurse notified; Gait belt; Call light within reach; Patient at risk for falls; Left in bed (MD/RN in with pt upon writers depature)           Patient Diagnosis(es): The encounter diagnosis was Acute postoperative pain.      has a past medical history of Arthritis, Asthma, COVID-19, Dizzy, Hyperlipidemia, Hypertension, Infection of knee, Shingles, Syncope, and Vasovagal syndrome. has a past surgical history that includes knee surgery; Facial cosmetic surgery; external auditory canal reconstruction; [place] tooth extraction; and Cardiac surgery (07/05/2015). L ONELIA - Dr. Ramesh Santo     Restrictions  Restrictions/Precautions  Restrictions/Precautions: General Precautions, Up as Tolerated  Position Activity Restriction  Other position/activity restrictions: L ONELIA, aqua cell dressing, polar care, LUE IV    Subjective   General  Chart Reviewed: Yes  Patient assessed for rehabilitation services?: Yes  Family / Caregiver Present: Yes (wife)  Subjective  Subjective: \"Did I just pass out? \"  General Comment  Comments: Pt resting in bed upon arrival, agreeable to OT Eval  Patient Currently in Pain: Yes        Social/Functional History  Social/Functional History  Lives With: Spouse  Type of Home: House  Home Layout: One level  Home Access: Stairs to enter with rails  Entrance Stairs - Number of Steps: 5  Entrance Stairs - Rails: Both  Bathroom Shower/Tub: Tub/Shower unit  Bathroom Toilet: Standard  Home Equipment: Rolling walker (EPC cuff, polar ice)  ADL Assistance: Independent  Homemaking Assistance: Independent (shares household chores)  Ambulation Assistance: Independent  Transfer Assistance: Independent  Active : Yes  Occupation: Retired  Type of occupation: recruiting  Leisure & Hobbies: wood working  Additional Comments: Pt denies any recent falls       Objective   Vision: Impaired (usally wears contacts)  Vision Exceptions: Wears glasses at all times  Hearing: Exceptions to [facility]  Hearing Exceptions: Hard of hearing/hearing concerns;  No hearing aid    Orientation  Overall Orientation Status: Within Functional Limits  Observation/Palpation  Posture: Good  Observation: resting in bed, L UE IV, Aqua Cell on L hip  Edema: none       Balance  Sitting Balance: Stand by assistance  Standing Balance: Minimal assistance (x2 staff assist needed for first stand after sx progressed to Min of 1)  Standing Balance  Time: standing araseli ~ 3-4 min  Activity: functional mobility  Functional Mobility  Functional - Mobility Device: Rolling Walker  Activity:  (bed down hallway ~ 50ft)  Assist Level: Minimal assistance  Functional Mobility Comments: Pt required Min verbal cues for pacing self, upright posture, RW safety, step sequence with RW all to increase safety. Following ambulation patient became dizzy, had patient sit in w/c and was taken to therapy gym to practice steps, patient's dizziness became worse and he became pale, deferred steps and patient was taken back to PACU. When back in PACU patient had syncopal episode that last for  approximately 2 minutes, blood sugar was WNL, /139 , HR 32-40. Pateint became more alert and was assisted back to bed Max A 2-3. RN/MD present throughout synocpal episode  Toilet Transfers  Toilet Transfer: Unable to assess  Toilet Transfers Comments: Pt with no needs       ADL  Feeding: Setup  Grooming: Setup; Stand by assistance (seated)  UE Bathing: Setup; Stand by assistance  LE Bathing: Setup; Moderate assistance  UE Dressing: Setup; Stand by assistance  LE Dressing: Setup; Moderate assistance (to don B socks)  Toileting: Moderate assistance       Tone RUE  RUE Tone: Normotonic  Tone LUE  LUE Tone: Normotonic  Coordination  Movements Are Fluid And Coordinated: Yes        Bed mobility  Supine to Sit: Moderate assistance  Sit to Supine: Maximum assistance; 2 Person assistance (2-3)  Comment: Pt required increased assist for sit to sup due to syncopal episode. Pt became more responsive but required staff assist MAX x 2-3.        Transfers  Sit to stand: Minimal assistance  Stand to sit: Minimal assistance  Transfer Comments: Pt given Min verbal cues for pacing self, upright posture, controlled stand to sit, reaching back to surface prior to sitting, pacing self, RW safety all to reduce risk of fall and increase safety. Cognition  Overall Cognitive Status: WFL        Sensation  Overall Sensation Status: WFL        LUE AROM (degrees)  LUE AROM : WFL  RUE AROM (degrees)  RUE AROM : WFL  LUE Strength  LUE Strength Comment: ~ 5/5  RUE Strength  RUE Strength Comment: ~ 5/5             Plan   Plan  Times per week: 5-6x/wk 1-2x/day as araseli  Current Treatment Recommendations: Patient/Caregiver Education & Training, Endurance Training, Equipment Evaluation, Education, & procurement, Self-Care / ADL, Home Management Training, Positioning, Safety Education & Training, Functional Mobility Training, Balance Training, Pain Management                          AM-PAC Score        AM-PAC Inpatient Daily Activity Raw Score: 16 (12/02/21 1724)  AM-PAC Inpatient ADL T-Scale Score : 35.96 (12/02/21 1724)  ADL Inpatient CMS 0-100% Score: 53.32 (12/02/21 1724)  ADL Inpatient CMS G-Code Modifier : CK (12/02/21 1724)        Goals  Short term goals  Time Frame for Short term goals: By discharge, pt to demo  Short term goal 1: ADL transfers and functional mobility to SBA with use of AD as needed. Short term goal 2: UB ADLs to Set up and LB ADLs to Min A with use of AD/AE as needed. Short term goal 3: bed mobility to Mod I with use of bedrails as needed. Short term goal 4: toileting to SBA with use of AD/grab bars as needed. Short term goal 5: I with fall prevention education, EC/WS tech, recommendaitons for AE, safe car transfers and adaptive ADL stratigies with use of handouts as needed. Patient Goals   Patient goals : To go home! Therapy Time   Individual Concurrent Group Co-treatment   Time In 5973         Time Out 9612         Minutes 53          tx time:40 min    Co-treatment with PT warranted first time up day of surgery. Cotx due to potential risk of decreased sensation, muscle control and proprioception from spinal epidural and/or regional block.  Decreased safety and independence requiring 2 skilled therapy professionals to address individual discipline's goals.           Danni Vergara, OT

## 2021-12-02 NOTE — ANESTHESIA POSTPROCEDURE EVALUATION
Department of Anesthesiology  Postprocedure Note    Patient: Tabby Murphy  MRN: [id number]  [place]: 1965  Date of evaluation: 12/2/2021  Time:  12:02 PM     Procedure Summary     Date: 12/02/21 Room / Location: [address] [place] [address] / [facility] - INPATIENT    Anesthesia Start: 0344 Anesthesia Stop: 3990    Procedure: LEFT HIP TOTAL ARTHROPLASTY ANTERIOR APPROACH - Almita Morse (Left Hip) Diagnosis: (LEFT HIP OA)    Surgeons: Elodia Barth MD Responsible Provider: Aydee Khan DO    Anesthesia Type: regional ASA Status: 3          Anesthesia Type: regional    Moncho Phase I: Moncho Score: 8    Moncho Phase II:      Last vitals: Reviewed and per EMR flowsheets.        Anesthesia Post Evaluation    Patient location during evaluation: PACU  Patient participation: complete - patient participated  Level of consciousness: awake and alert  Airway patency: patent  Nausea & Vomiting: no nausea and no vomiting  Complications: no  Cardiovascular status: hemodynamically stable  Respiratory status: acceptable  Hydration status: stable

## 2021-12-02 NOTE — ACP (ADVANCE CARE PLANNING)
Advance Care Planning     Advance Care Planning Activator (Inpatient)  Conversation Note      Date of ACP Conversation: 12/2/2021     Conversation Conducted with: Patient with Decision Making Capacity    ACP Activator: Fidel Leyva RN    {When Decision Maker makes decisions on behalf of the incapacitated patient: Decision Maker is asked to consider and make decisions based on patient values, known preferences, or best interests. Health Care Decision Maker:     Current Designated Health Care Decision Maker:   Deyanira Sy (wife)  Phone: [phone]    Click here to complete Healthcare Decision Makers including section of the Healthcare Decision Maker Relationship (ie \"Primary\")      Care Preferences    Ventilation: \"If you were in your present state of health and suddenly became very ill and were unable to breathe on your own, what would your preference be about the use of a ventilator (breathing machine) if it were available to you? \"      Would the patient desire the use of ventilator (breathing machine)?: yes    \"If your health worsens and it becomes clear that your chance of recovery is unlikely, what would your preference be about the use of a ventilator (breathing machine) if it were available to you? \"     Would the patient desire the use of ventilator (breathing machine)?: No      Resuscitation  \"CPR works best to restart the heart when there is a sudden event, like a heart attack, in someone who is otherwise healthy. Unfortunately, CPR does not typically restart the heart for people who have serious health conditions or who are very sick. \"    \"In the event your heart stopped as a result of an underlying serious health condition, would you want attempts to be made to restart your heart (answer \"yes\" for attempt to resuscitate) or would you prefer a natural death (answer \"no\" for do not attempt to resuscitate)? \" yes       [] Yes   [x] No   Educated Patient / Decision Maker regarding differences between Advance Directives and portable DNR orders.     Length of ACP Conversation in minutes:  5    Conversation Outcomes:  [x] ACP discussion completed  [] Existing advance directive reviewed with patient; no changes to patient's previously recorded wishes  [] New Advance Directive completed  [] Portable Do Not Rescitate prepared for Provider review and signature  [] POLST/POST/MOLST/MOST prepared for Provider review and signature      Follow-up plan:    [] Schedule follow-up conversation to continue planning  [] Referred individual to Provider for additional questions/concerns   [] Advised patient/agent/surrogate to review completed ACP document and update if needed with changes in condition, patient preferences or care setting    [] This note routed to one or more involved healthcare providers

## 2021-12-02 NOTE — H&P
History and Physical Update    Pt Name: Jagdish Hitchcock  MRN: [id number]  [place]: 1965  Date of evaluation: 12/2/2021    [x] I have examined the patient and reviewed the H&P/Consult and there are no changes to the patient or plans.     [] I have examined the patient and reviewed the H&P/Consult and have noted the following changes:        Derrick Pollard MD   Electronically signed 12/2/2021 at 7:20 AM

## 2021-12-02 NOTE — PROGRESS NOTES
Physical Therapy    Facility/Department: [facility] OR  Initial Assessment    NAME: Audrey Meneses  : 1965  MRN: [id number]    Date of Service: 2021    Discharge Recommendations:  Continue to assess pending progress        Assessment   Body structures, Functions, Activity limitations: Decreased functional mobility ; Decreased safe awareness; Decreased balance; Decreased endurance; Decreased strength  Assessment: Skilled therapy needed to maximize independence with functional mobility as well as improve endurance, strength, and balance to ensure safe discharge. Prognosis: Good  Decision Making: High Complexity  Exam: AROM, strength, endurance, mobility, balance, AM-PAC  Clinical Presentation: unstable  PT Education: Goals; Transfer Training; PT Role; Plan of Care; General Safety; Gait Training; Family Education  REQUIRES PT FOLLOW UP: Yes  Activity Tolerance  Activity Tolerance: Treatment limited secondary to medical complications (free text)  Activity Tolerance: treatment limited by syncopal episode       Patient Diagnosis(es): The encounter diagnosis was Acute postoperative pain. has a past medical history of Arthritis, Asthma, COVID-19, Dizzy, Hyperlipidemia, Hypertension, Infection of knee, Shingles, Syncope, and Vasovagal syndrome. has a past surgical history that includes knee surgery; Facial cosmetic surgery; external auditory canal reconstruction; [place] tooth extraction; and Cardiac surgery (2015). Restrictions  Restrictions/Precautions  Restrictions/Precautions: General Precautions, Up as Tolerated  Position Activity Restriction  Other position/activity restrictions: L ONELIA  Vision/Hearing  Vision: Impaired (usally wears contacts)  Vision Exceptions: Wears glasses at all times  Hearing: Exceptions to [facility]  Hearing Exceptions: Hard of hearing/hearing concerns;  No hearing aid     Subjective  General  Chart Reviewed: Yes  Patient assessed for rehabilitation services?: Yes  Family / Caregiver Present: Yes (spouse present)  General Comment  Comments: RN states patient appropriate for therapy  Subjective  Subjective: patient pleasant and motivated          Orientation  Orientation  Overall Orientation Status: Within Functional Limits  Social/Functional History  Social/Functional History  Lives With: Spouse  Type of Home: House  Home Layout: One level  Home Access: Stairs to enter with rails  Entrance Stairs - Number of Steps: 5  Entrance Stairs - Rails: Both  Bathroom Shower/Tub: Tub/Shower unit  Bathroom Toilet: Standard  Home Equipment: Rolling walker (EPC cuff, polar ice)  ADL Assistance: Independent  Homemaking Assistance: Independent (shares household chores)  Ambulation Assistance: Independent  Transfer Assistance: Independent  Active : Yes  Occupation: Retired  Type of occupation: recruiting  Leisure & Hobbies: wood working  Additional Comments: Pt denies any recent falls  Cognition        Objective     Observation/Palpation  Posture: Good  Observation: resting in bed, L UE IV    AROM RLE (degrees)  RLE AROM: WFL  AROM LLE (degrees)  LLE AROM : WFL  AROM RUE (degrees)  RUE General AROM: refer to OT assessment  AROM LUE (degrees)  LUE General AROM: refer to OT assessment  Strength RLE  Strength RLE: WFL  Strength LLE  Comment: N/T due to recent surgery grossly 4/5 at knee and ankle, hip 3-/5  Strength RUE  Comment: refer to OT assessment  Strength LUE  Comment: refer to OT assessment  Motor Control  Gross Motor?: WFL  Sensation  Overall Sensation Status: WFL  Bed mobility  Supine to Sit: Moderate assistance  Sit to Supine: Maximum assistance; 2 Person assistance  Comment: increased assist needed for sit>sup due to syncopal episode, patient had become more responsive but still required max A x3  Transfers  Sit to Stand: Minimal Assistance  Stand to sit: Minimal Assistance  Comment: initially patient min A for sit<>stand, cues for proper hand placement.  Following syncopal episode patient required Max A x2 for sit<>stand  Ambulation  Ambulation?: Yes  More Ambulation?: No  Ambulation 1  Surface: level tile  Device: Rolling Walker  Assistance: Minimal assistance  Quality of Gait: step to pattern  Gait Deviations: Slow Tyra  Distance: 48'  Comments: following ambulation patient became dizzy, had patient sit in w/c and was taken to therapy gym to practice steps, patient's dizziness became worse and he became pale, deferred steps and patient was taken back to PACU. When back in PACU patient had syncopal episode that last for  approximately 2 minutes, blood sugar was WNL, /139 , HR 32. Pateint became more alert and was assisted back to bed Max A 2-3     Balance  Sitting - Static: Good  Sitting - Dynamic: Good  Standing - Static: Fair; + (with initial standing)  Standing - Dynamic: Fair (with initial standing)        Plan   Plan  Times per week: 1-2x/day for 5-6 days/week  Current Treatment Recommendations: Strengthening, Transfer Training, Endurance Training, Patient/Caregiver Education & Training, Balance Training, Gait Training, Safety Education & Training  Safety Devices  Type of devices: Gait belt, Left in bed, Nurse notified      AM-PAC Score  AM-PAC Inpatient Mobility Raw Score : 15 (12/02/21 1512)  AM-PAC Inpatient T-Scale Score : 39.45 (12/02/21 1512)  Mobility Inpatient CMS 0-100% Score: 57.7 (12/02/21 1512)  Mobility Inpatient CMS G-Code Modifier : CK (12/02/21 1512)          Goals  Short term goals  Time Frame for Short term goals: 8 visits  Short term goal 1: Independent bed mobility  Short term goal 2:  Independent sit<>stand  Short term goal 3: Patient to ambulate 100 feet with roll walker SBA  Short term goal 4: Patient to ascend/descend 5 steps with 1 HR CGA       Therapy Time   Individual Concurrent Group Co-treatment   Time In 3959         Time Out 1456 (additional 10 minutes for chart review)         Minutes 46              Co-treatment with OT warranted secondary to decreased safety and independence requiring 2 skilled therapy professionals to address individual discipline's goals. PT addressing transfer training.       Gurinder Patterson, PT

## 2021-12-02 NOTE — PROGRESS NOTES
1430 - PT/OT working with patient in the hallway. PT/OT brought pt back into PACU stating that pt had gotten light headed and dizzy. Pt appeared very pale and diaphoretic. Pt disoriented, did not know where he was. Anesthesia called and reported to bedside. Pt assisted back to bed with the help of 3 assist. Once back in bed pt began to feel less dizzy, regain orientation, and states that he is feeling better. BS was 178. VS were stable. Fluid bolus ordered and started. Will continue to monitor. Plans to transfer patient to med surg to watch for a few more hours before he is discharged.

## 2021-12-02 NOTE — PROGRESS NOTES
Pt admitted to room #2011 from PACU  Oriented to room and call light/tv controls. Bed in lowest position, wheels locked, 2/4 side rails up  Call light in reach, room free of clutter, adequate lighting provided.

## 2021-12-02 NOTE — ANESTHESIA PRE PROCEDURE
Department of Anesthesiology  Preprocedure Note       Name:  Laurita Mathew   Age:  60 y.o.  :  1965                                          MRN:  [id number]         Date:  2021      Surgeon: Jessica Holder):  Frank Powell MD    Procedure: Procedure(s):  LEFT HIP TOTAL ARTHROPLASTY ANTERIOR APPROACH - Laurita Cummings    Medications prior to admission:   Prior to Admission medications    Medication Sig Start Date End Date Taking? Authorizing Provider   Misc. Devices [facility]) MISC Roll-about type walker 21  Yes Frank Powell MD   docusate sodium (COLACE) 100 MG capsule Take 1 capsule by mouth 2 times daily 21  Yes Frank Powell MD   ondansetron [facility]) 4 MG tablet Take 1 tablet by mouth every 6 hours as needed for Nausea 21  Yes Frank Powell MD   aspirin 325 MG EC tablet Take 1 tablet by mouth 2 times daily for 28 days 21 Yes Frank Powell MD   oxyCODONE-acetaminophen (PERCOCET) 5-325 MG per tablet Take 1-2 tablets by mouth every 4 hours as needed for Pain for up to 7 days.  21 Yes Frank Powell MD   lisinopril-hydroCHLOROthiazide GOLDMAN [facility]) 20-12.5 MG per tablet Take 2 tablets by mouth daily 11/10/21  Yes Historical Provider, MD   citalopram (CELEXA) 20 MG tablet Take 20 mg by mouth daily   Yes Historical Provider, MD   gemfibrozil (LOPID) 600 MG tablet Take 600 mg by mouth daily 10/20/21   Historical Provider, MD   loratadine (CLARITIN) 10 MG tablet Take 10 mg by mouth daily as needed    Historical Provider, MD       Current medications:    Current Facility-Administered Medications   Medication Dose Route Frequency Provider Last Rate Last Admin    [START ON 12/3/2021] 0.9 % sodium chloride infusion   IntraVENous Continuous Prince Loredo DO        [START ON 12/3/2021] lactated ringers infusion   IntraVENous Continuous Marcie Miller  mL/hr at 21 0614 New Bag at 21 0614    sodium chloride flush 0.9 % injection 10 mL 10 mL IntraVENous 2 times per day Prince Loredo DO        sodium chloride flush 0.9 % injection 10 mL  10 mL IntraVENous PRN Pili Pineda DO        0.9 % sodium chloride infusion  25 mL IntraVENous PRN Pili Pineda DO        [START ON 12/3/2021] lidocaine PF 1 % injection 1 mL  1 mL IntraDERmal Once PRN Prince Loredo DO        dexamethasone (PF) (DECADRON) injection 10 mg  10 mg IntraVENous Once Doyle Landau, MD        ceFAZolin (ANCEF) 2000 mg in dextrose 5 % 50 mL IVPB  2,000 mg IntraVENous Once Doyle Landau, MD        scopolamine (TRANSDERM-SCOP) transdermal patch 1 patch  1 patch TransDERmal Once Doyle Landau, MD   1 patch at 12/02/21 3742    tranexamic acid (CYKLOKAPRON) 1000 MG/10ML injection             povidone-iodine 10% in sodium chloride 0.9% irrigation             bupivacaine liposome (EXPAREL) 1.3 % injection                Allergies:  No Known Allergies    Problem List:    Patient Active Problem List   Diagnosis Code   (none) - all problems resolved or deleted       Past Medical History:        Diagnosis Date    Arthritis     exercise induced    Asthma     exercise induced asthma    COVID-19 11/2021    Dizzy     Hyperlipidemia     Hypertension     Infection of knee end of June 2015    left knee treated with IV antibiotics    Shingles     2019    Syncope     Vasovagal syndrome     per tilt test       Past Surgical History:        Procedure Laterality Date    CARDIAC SURGERY  07/05/2015    Loop Recorder Implant    EXTERNAL AUDITORY CANAL RECONSTRUCTION      FACIAL COSMETIC SURGERY      Pt denies    KNEE SURGERY      WISDOM TOOTH EXTRACTION         Social History:    Social History     Tobacco Use    Smoking status: Former Smoker     Types: Cigars    Smokeless tobacco: Never Used   Substance Use Topics    Alcohol use:  Yes     Alcohol/week: 0.0 standard drinks     Comment: social                                Counseling given: Not Answered      Vital Signs (Current):   Vitals:    12/02/21 0554 12/02/21 0606   BP: (!) 143/87    Pulse: 74    Resp: 20    Temp: 97.8 °F (36.6 °C)    TempSrc: Temporal    SpO2: 97%    Weight:  222 lb 3.6 oz (100.8 kg)   Height:  5' 9\" (1.753 m)                                              BP Readings from Last 3 Encounters:   12/02/21 (!) 143/87   11/26/21 (!) 150/88   07/20/15 128/77       NPO Status: Time of last liquid consumption: 2200                        Time of last solid consumption: 2200                        Date of last liquid consumption: 12/01/21                        Date of last solid food consumption: 12/01/21    BMI:   Wt Readings from Last 3 Encounters:   12/02/21 222 lb 3.6 oz (100.8 kg)   11/26/21 222 lb 4.8 oz (100.8 kg)   07/20/15 230 lb (104.3 kg)     Body mass index is 32.82 kg/m². CBC:   Lab Results   Component Value Date    WBC 5.0 11/26/2021    RBC 4.50 11/26/2021    RBC 4.88 02/22/2012    HGB 12.9 11/26/2021    HCT 38.7 11/26/2021    MCV 86.0 11/26/2021    RDW 12.6 11/26/2021     11/26/2021     02/22/2012       CMP:   Lab Results   Component Value Date     11/26/2021    K 4.3 11/26/2021     11/26/2021    CO2 22 11/26/2021    BUN 17 11/26/2021    CREATININE 0.91 11/26/2021    GFRAA >60 11/26/2021    LABGLOM >60 11/26/2021    GLUCOSE 114 11/26/2021    GLUCOSE 111 02/22/2012    CALCIUM 9.2 11/26/2021       POC Tests: No results for input(s): POCGLU, POCNA, POCK, POCCL, POCBUN, POCHEMO, POCHCT in the last 72 hours.     Coags: No results found for: PROTIME, INR, APTT    HCG (If Applicable): No results found for: PREGTESTUR, PREGSERUM, HCG, HCGQUANT     ABGs: No results found for: PHART, PO2ART, XWD0TAF, XBY3RWS, BEART, P2WBJEDZ     Type & Screen (If Applicable):  No results found for: LABABO, LABRH    Drug/Infectious Status (If Applicable):  No results found for: HIV, HEPCAB    COVID-19 Screening (If Applicable): No results found for: COVID19        Anesthesia Evaluation  Patient summary reviewed and Nursing notes reviewed no history of anesthetic complications:   Airway: Mallampati: II  TM distance: >3 FB   Neck ROM: full  Mouth opening: > = 3 FB Dental: normal exam         Pulmonary:normal exam    (+) asthma:                            Cardiovascular:  Exercise tolerance: no interval change,   (+) hypertension:, hyperlipidemia          Rate: normal                    Neuro/Psych:               GI/Hepatic/Renal:        (-) GERD       Endo/Other:    (+) : arthritis:., .                 Abdominal:             Vascular: Other Findings:             Anesthesia Plan      spinal     ASA 3       Induction: intravenous. MIPS: Prophylactic antiemetics administered. Anesthetic plan and risks discussed with patient. Plan discussed with CRNA.     Attending anesthesiologist reviewed and agrees with Preprocedure content              Aydee Khan DO   12/2/2021

## 2021-12-02 NOTE — PROGRESS NOTES
Ortho Coordinator Daily Rounding Note    Admission Date:   12/2/2021 Lyle Gonzalez is a 60 y.o.  male    Post Op Day # 0 [facility], MET WITH PT IS PACU    Labs:         No results for input(s): WBC, HGB, PLT in the last 72 hours. No results for input(s): NA, K, CL, CO2, BUN, CREATININE, GLUCOSE in the last 72 hours. Met with:     Patient  Patient's LOC:   alert and oriented X3  Patient progress   GOOD, WAITING TO WORK WITH PT/OT AND THEN DISCHARGE IF MEETS SAME DAY CRITERIA. Patient's Pain:   Patient rates pain 4  Oral Intake:    good  Output:    PT WILL NEED TO VOID BEFROE DISCHARGE   [place] in:   no  ON-Q:    no    Walker/DME:  Walker/DME needed:  Yes  DME needed:    walker   DME Agency:    ORDERED THIS MORNING FROM [facility] AND PT HAS HAD DELIVERED AND IS AT BEDSIDE. Anticoagulation:  Anticoagulation:  325MG EC ASA  Prescription in chart:  yes     Continuity of Care: The [address] form is on the chart. The [address] [place] form is not signed by the physician. Discharge Planning:  Confirmed with patient that discharge plan is Home. Agency/Facility chosen: NONE  Awaiting pre-certification no  Anticipated discharge date: 120/02/2021. Patient's questions and concerns were answered. Actively listened and provided reassurance.      Electronically signed by: Braulio Warren RN on 12/2/2021 at 2:30 PM

## 2021-12-02 NOTE — CARE COORDINATION
Case Management Initial Discharge Plan  Santosh Alanis,             Met with:patient or spouse/SO to discuss discharge plans. Information verified: address, contacts, phone number, , insurance Yes  PCP: Kelly Cottrell MD  Date of last visit:     Insurance Provider: [employer]    Discharge Planning    Living Arrangements:  Spouse/Significant Other   Support Systems:  Spouse/Significant Other    Home has 1 stories  5 stairs to climb to get into front door, 0 stairs to climb to reach second floor  Location of bedroom/bathroom in home  - main floor    Patient able to perform ADL's:Independent    Current Services (outpatient & in home) none  DME equipment: walker, ice machine, EPC cuffs  DME provider: [address] [place] [address] [address]: [facility]    Potential Assistance Needed:  N/A    Patient agreeable to home care: No  [place] of choice provided:  n/a    Prior SNF/Rehab Placement and Facility: No  Agreeable to SNF/Rehab: No  [place] of choice provided: n/a   Evaluation: n/a    Expected Discharge date:  21  Patient expects to be discharged to:   home  Follow Up Appointment: Best Day/ Time:      Transportation provider: wife  Transportation arrangements needed for discharge: No    Readmission Risk              Risk of Unplanned Readmission:  0             Does patient have a readmission risk score greater than 14?: No  If yes, follow-up appointment must be made within 7 days of discharge. Goal of Care:       Discharge Plan: Met with patient at bedside. Lives with wife, independent and drives. S/P lt total hip replacement. Walker delivered to room. Declines need for HHC. Post op appointment with Dr. Ishaan Richey  at 8:30am.  Plan is for D/C home tomorrow.           Electronically signed by Loni Huffman RN on 21 at 5:36 PM EST

## 2021-12-02 NOTE — ANESTHESIA PROCEDURE NOTES
Spinal Block    Patient location during procedure: OR  Start time: 12/2/2021 7:27 AM  End time: 12/2/2021 7:37 AM  Reason for block: primary anesthetic  Staffing  Performed: resident/CRNA   Resident/CRNA: THI Laureano CRNA  Spinal Block  Patient position: sitting  Prep: Betadine  Patient monitoring: continuous pulse ox and frequent blood pressure checks  Approach: midline  Location: L3/L4  Provider prep: mask and sterile gloves  Agent: bupivacaine  Needle  Needle type: Pencan   Needle gauge: 24 G  Assessment  Sensory level: T6  Events: cerebrospinal fluid  Swirl obtained: Yes  CSF: clear  Attempts: 2  Hemodynamics: stable

## 2021-12-03 PROCEDURE — 6360000002 HC RX W HCPCS: Performed by: ORTHOPAEDIC SURGERY

## 2021-12-03 PROCEDURE — 97110 THERAPEUTIC EXERCISES: CPT

## 2021-12-03 PROCEDURE — 97116 GAIT TRAINING THERAPY: CPT

## 2021-12-03 PROCEDURE — 6370000000 HC RX 637 (ALT 250 FOR IP): Performed by: ORTHOPAEDIC SURGERY

## 2021-12-03 PROCEDURE — 2580000003 HC RX 258: Performed by: ORTHOPAEDIC SURGERY

## 2021-12-03 PROCEDURE — 97535 SELF CARE MNGMENT TRAINING: CPT

## 2021-12-03 RX ADMIN — GEMFIBROZIL 600 MG: 600 TABLET ORAL at 08:16

## 2021-12-03 RX ADMIN — POLYETHYLENE GLYCOL 3350 17 G: 17 POWDER, FOR SOLUTION ORAL at 08:17

## 2021-12-03 RX ADMIN — SODIUM CHLORIDE: 9 INJECTION, SOLUTION INTRAVENOUS at 01:19

## 2021-12-03 RX ADMIN — CETIRIZINE HYDROCHLORIDE 10 MG: 10 TABLET, FILM COATED ORAL at 08:16

## 2021-12-03 RX ADMIN — SODIUM CHLORIDE, PRESERVATIVE FREE 10 ML: 5 INJECTION INTRAVENOUS at 08:17

## 2021-12-03 RX ADMIN — CITALOPRAM HYDROBROMIDE 40 MG: 20 TABLET ORAL at 08:17

## 2021-12-03 RX ADMIN — LISINOPRIL AND HYDROCHLOROTHIAZIDE 2 TABLET: 12.5; 2 TABLET ORAL at 08:16

## 2021-12-03 RX ADMIN — OXYCODONE 5 MG: 5 TABLET ORAL at 01:38

## 2021-12-03 RX ADMIN — ACETAMINOPHEN 650 MG: 325 TABLET ORAL at 11:00

## 2021-12-03 RX ADMIN — CEFAZOLIN SODIUM 2000 MG: 10 INJECTION, POWDER, FOR SOLUTION INTRAVENOUS at 01:19

## 2021-12-03 RX ADMIN — ACETAMINOPHEN 650 MG: 325 TABLET ORAL at 04:59

## 2021-12-03 RX ADMIN — Medication 325 MG: at 08:16

## 2021-12-03 RX ADMIN — OXYCODONE 10 MG: 5 TABLET ORAL at 11:01

## 2021-12-03 RX ADMIN — OXYCODONE 5 MG: 5 TABLET ORAL at 05:57

## 2021-12-03 ASSESSMENT — PAIN DESCRIPTION - PAIN TYPE
TYPE: SURGICAL PAIN

## 2021-12-03 ASSESSMENT — PAIN DESCRIPTION - ONSET
ONSET: ON-GOING

## 2021-12-03 ASSESSMENT — PAIN DESCRIPTION - FREQUENCY
FREQUENCY: CONTINUOUS

## 2021-12-03 ASSESSMENT — PAIN DESCRIPTION - LOCATION
LOCATION: HIP

## 2021-12-03 ASSESSMENT — PAIN DESCRIPTION - DESCRIPTORS
DESCRIPTORS: ACHING
DESCRIPTORS: ACHING
DESCRIPTORS: ACHING;DISCOMFORT
DESCRIPTORS: ACHING

## 2021-12-03 ASSESSMENT — PAIN - FUNCTIONAL ASSESSMENT
PAIN_FUNCTIONAL_ASSESSMENT: PREVENTS OR INTERFERES SOME ACTIVE ACTIVITIES AND ADLS

## 2021-12-03 ASSESSMENT — PAIN DESCRIPTION - PROGRESSION
CLINICAL_PROGRESSION: NOT CHANGED
CLINICAL_PROGRESSION: GRADUALLY WORSENING

## 2021-12-03 ASSESSMENT — PAIN DESCRIPTION - ORIENTATION
ORIENTATION: LEFT

## 2021-12-03 ASSESSMENT — PAIN SCALES - GENERAL
PAINLEVEL_OUTOF10: 7
PAINLEVEL_OUTOF10: 8
PAINLEVEL_OUTOF10: 6
PAINLEVEL_OUTOF10: 7
PAINLEVEL_OUTOF10: 6
PAINLEVEL_OUTOF10: 6
PAINLEVEL_OUTOF10: 3

## 2021-12-03 NOTE — PROGRESS NOTES
Orthopaedic Progress Note      SUBJECTIVE   Mr. Nu Christie is post op day # 1   denies pain. Patient notes patient reports having an episode of syncope yesterday while getting up with therapy. He has gotten up several times since and has not had a recurrence of this. Because of this they did end up keeping him overnight for observation. He reports that he is doing well this morning. OBJECTIVE      Physical    VITALS:  BP (!) 104/55   Pulse 64   Temp 96.8 °F (36 °C) (Oral)   Resp 16   Ht 5' 9\" (1.753 m)   Wt 222 lb 3.6 oz (100.8 kg)   SpO2 94%   BMI 32.82 kg/m²   INTAKE/OUTPUT:    Intake/Output Summary (Last 24 hours) at 12/3/2021 0825  Last data filed at 12/3/2021 0647  Gross per 24 hour   Intake 4540 ml   Output 800 ml   Net 3740 ml     TEMPERATURE:  Current - Temp: 96.8 °F (36 °C); Max - Temp  Av.6 °F (37 °C)  Min: 96.8 °F (36 °C)  Max: 99.1 °F (37.3 °C)  RESPIRATIONS RANGE: Resp  Av.6  Min: 0  Max: 23  PULSE RANGE: Pulse  Av.7  Min: 54  Max: 69  BLOOD PRESSURE RANGE:  Systolic (13LQC), QPW:144 , Min:85 , BQA:207   ; Diastolic (04KRR), EVU:87, Min:47, Max:86    I/O last 3 completed shifts: In: 8361 [P.O.:790;  I.V.:3750]  Out: 800 [Urine:500; Blood:300]      He is awake and oriented x3 this morning  His Aquasol dressing is clean dry and intact  He has some minor swelling and bruising about the left thigh  He is neurovascular intact the entire left lower extremity  He has a negative Poncho and no signs of DVT today      Data  CBC:   Lab Results   Component Value Date    WBC 5.0 2021    HGB 12.9 2021     2021     2012     BMP:    Lab Results   Component Value Date     2021    K 4.3 2021     2021    CO2 22 2021    BUN 17 2021    CREATININE 0.91 2021    CALCIUM 9.2 2021    GLUCOSE 114 2021    GLUCOSE 111 2012     Uric Acid:  No components found for: URIC  PT/INR:  No results found for: PROTIME, INR  Troponin:  No results found for: TROPONINI  Urine Culture:  No components found for: CURINE      Current Inpatient Medications    Current Facility-Administered Medications: scopolamine (TRANSDERM-SCOP) transdermal patch 1 patch, 1 patch, TransDERmal, Once  citalopram (CELEXA) tablet 40 mg, 40 mg, Oral, Daily  gemfibrozil (LOPID) tablet 600 mg, 600 mg, Oral, BID  lisinopril-hydroCHLOROthiazide (PRINZIDE;ZESTORETIC) 20-12.5 MG per tablet 2 tablet, 2 tablet, Oral, Daily  cetirizine (ZYRTEC) tablet 10 mg, 10 mg, Oral, Daily  sodium chloride flush 0.9 % injection 5-40 mL, 5-40 mL, IntraVENous, 2 times per day  sodium chloride flush 0.9 % injection 5-40 mL, 5-40 mL, IntraVENous, PRN  0.9 % sodium chloride infusion, 25 mL, IntraVENous, PRN  acetaminophen (TYLENOL) tablet 650 mg, 650 mg, Oral, Q6H  ondansetron (ZOFRAN-ODT) disintegrating tablet 4 mg, 4 mg, Oral, Q8H PRN **OR** ondansetron (ZOFRAN) injection 4 mg, 4 mg, IntraVENous, Q6H PRN  0.9 % sodium chloride infusion, , IntraVENous, Continuous  oxyCODONE (ROXICODONE) immediate release tablet 5 mg, 5 mg, Oral, Q4H PRN **OR** oxyCODONE (ROXICODONE) immediate release tablet 10 mg, 10 mg, Oral, Q4H PRN  HYDROmorphone (DILAUDID) injection 0.25 mg, 0.25 mg, IntraVENous, Q3H PRN **OR** HYDROmorphone (DILAUDID) injection 0.5 mg, 0.5 mg, IntraVENous, Q3H PRN  hydrOXYzine (ATARAX) tablet 10 mg, 10 mg, Oral, Q8H PRN  polyethylene glycol (GLYCOLAX) packet 17 g, 17 g, Oral, Daily  bisacodyl (DULCOLAX) EC tablet 5 mg, 5 mg, Oral, Daily PRN  aspirin EC tablet 325 mg, 325 mg, Oral, BID        PLAN    I will have him discharged home today  I will see him back in my clinic in 2 weeks      Terrell Murillo MD

## 2021-12-03 NOTE — PROGRESS NOTES
Occupational Therapy  Facility/Department: [facility] MED SURG  Daily Treatment Note  NAME: Laurita Mathew  : 1965  MRN: [id number]    Date of Service: 12/3/2021    Discharge Recommendations:  Home with assist PRN  OT Equipment Recommendations  Equipment Needed: Yes  Mobility Devices: Rajan Hilt  ADL Assistive Devices: Long-handled Shoe Horn; Long-handled Sponge; Reacher; Sock-Aid Hard; Shower Chair with back; Grab Bars - shower    Assessment   Performance deficits / Impairments: Decreased functional mobility ; Decreased ADL status; Decreased strength; Decreased endurance; Decreased high-level IADLs; Decreased balance; Decreased posture; Decreased coordination  Assessment: Pt. declined showering task stating he would complete at home today. Pt. instructed on washing surgical site with steril solution and appeared receptive to information. Pt. provided with home exercise program to promote functional strength of B UE. Skilled OT warranted to promote I/safety to return to prior living arrangement with assist as needed. Prognosis: Good  OT Education: OT Role; Transfer Training; Energy Conservation; Plan of Care; ADL Adaptive Strategies; Precautions; Family Education; Equipment  Patient Education: Pt. educated on washing with steril solution around surgical site and HEP to promote UE strength. REQUIRES OT FOLLOW UP: Yes  Activity Tolerance  Activity Tolerance: Patient Tolerated treatment well  Activity Tolerance: fair  Safety Devices  Safety Devices in place: Yes  Type of devices: Nurse notified; Gait belt; Call light within reach; Patient at risk for falls; Left in bed         Patient Diagnosis(es): The encounter diagnosis was Acute postoperative pain. has a past medical history of Arthritis, Asthma, COVID-19, Dizzy, Hyperlipidemia, Hypertension, Infection of knee, Shingles, Syncope, and Vasovagal syndrome. has a past surgical history that includes knee surgery;  Facial cosmetic surgery; external auditory canal reconstruction; Ter[place] tooth extraction; and Cardiac surgery (07/05/2015). Restrictions  Restrictions/Precautions  Restrictions/Precautions: General Precautions, Up as Tolerated  Required Braces or Orthoses?: No  Position Activity Restriction  Other position/activity restrictions: L ONELIA, aqua cell dressing, polar care, LUE IV  Subjective   General  Chart Reviewed: Yes  Patient assessed for rehabilitation services?: Yes  Response to previous treatment: Patient with no complaints from previous session  Family / Caregiver Present: No  Subjective  Subjective: \"I feel much better today\". \"I'm going to take a shower at home\". General Comment  Comments: Pt. resting in bed upon arrival to room. Pt. agreeable for treatment      Orientation  Orientation  Overall Orientation Status: Within Functional Limits  Objective    ADL  Additional Comments: Pt. declined Shower stating he was going home and would take one. Pt. instructed on washing with steril solution around bandage/surgical site. Cognition  Overall Cognitive Status: WFL     Perception  Overall Perceptual Status: WFL              Type of ROM/Therapeutic Exercise  Type of ROM/Therapeutic Exercise: AROM  Comment: Pt. instruncted on HEP with resistive bands to promote functional strength and mobility. Pt issued Yumber hand outs for reference. Exercises  Shoulder Flexion: x10  Shoulder Extension: x10  Shoulder ABduction: x10  Shoulder ADduction: x10  Horizontal ABduction: x10  Horizontal ADduction: x10  Elbow Flexion: x10  Elbow Extension: x10  Other: Instruction completed and pt appeared receptive and attentive to information.          Plan   Plan  Times per week: 5-6x/wk 1-2x/day as araseli  Times per day: Daily  Current Treatment Recommendations: Patient/Caregiver Education & Training, Endurance Training, Equipment Evaluation, Education, & procurement, Self-Care / ADL, Home Management Training, Positioning, Safety Education & Training, Functional Mobility Training, Balance Training, Pain Management  Plan Comment: Cont with stated POC          AM-PAC Score        AM-PAC Inpatient Daily Activity Raw Score: 16 (12/03/21 0849)  AM-PAC Inpatient ADL T-Scale Score : 35.96 (12/03/21 0849)  ADL Inpatient CMS 0-100% Score: 53.32 (12/03/21 0849)  ADL Inpatient CMS G-Code Modifier : CK (12/03/21 0849)    Goals  Short term goals  Time Frame for Short term goals: By discharge, pt to demo  Short term goal 1: ADL transfers and functional mobility to SBA with use of AD as needed. Short term goal 2: UB ADLs to Set up and LB ADLs to Min A with use of AD/AE as needed. Short term goal 3: bed mobility to Mod I with use of bedrails as needed. Short term goal 4: toileting to SBA with use of AD/grab bars as needed. Short term goal 5: I with fall prevention education, EC/WS tech, recommendaitons for AE, safe car transfers and adaptive ADL stratigies with use of handouts as needed. Patient Goals   Patient goals : To go home! Therapy Time   Individual Concurrent Group Co-treatment   Time In 2211         Time Out 0845         Minutes 25              RN reports patient is medically stable for therapy treatment this date. Chart reviewed prior to treatment and patient is agreeable for therapy. All lines intact and patient positioned comfortably at end of treatment. All patient needs addressed prior to ending therapy session.       TRINIDAD Bell

## 2021-12-03 NOTE — FLOWSHEET NOTE
[facility]    [facility] will follow as needed. (writer charting for [place].)     59/54/32 [id number]   Encounter Summary   Services provided to: Patient   Referral/Consult From: Rounding   Place of Mu-ism None   Continue Visiting   (12/3/21 anointed)   Complexity of Encounter Low   Length of Encounter 15 minutes   Routine   Type Initial   Sacraments   Sacrament of Sick-Anointing Anointed  (12/3/21 Fr. Randa Weller)

## 2021-12-03 NOTE — PROGRESS NOTES
Physical Therapy  Facility/Department: [facility] MED SURG  Daily Treatment Note  NAME: Jagdish Hitchcock  : 1965  MRN: [id number]    Date of Service: 12/3/2021   REYNALDO Saleh reports patient is medically stable for therapy treatment this date. Chart reviewed prior to treatment and patient is agreeable for therapy. All lines intact and patient positioned comfortably at end of treatment. All patient needs addressed prior to ending therapy session. PT Equipment Recommendations  Equipment Needed: No (Pt has RW)    Assessment   Body structures, Functions, Activity limitations: Decreased functional mobility ; Decreased safe awareness; Decreased balance; Decreased endurance; Decreased strength  Assessment: Pt tolerated session well. Pt w/ fair steadiness throughout ambulation and stair negotiation this date. Pt is safe for discharge home. Pt would benefit from continued skilled physical therapy to address ROM, strength, and gait deficits throughout L ONELIA recovery in order to return to PLOF. Prognosis: Good  Decision Making: High Complexity  PT Education: Goals; Transfer Training; PT Role; Plan of Care; General Safety; Gait Training; Functional Mobility Training; Home Exercise Program; Precautions; Energy Conservation  Patient Education: Pt educated on: importance of continued mobility, s/s of infection, general safety awareness, fall risk prevention, safe use of RW throughout transfers and ambulation, car transfers, pursed lip breathing, prevention of sedentary complications, supine & seated ther ex, proper gait pattern w/ RW post-op, proper stair negotiation pattern post-op, and PT POC. Pt w/ good return demo. REQUIRES PT FOLLOW UP: Yes  Activity Tolerance  Activity Tolerance: Patient Tolerated treatment well     Patient Diagnosis(es): The encounter diagnosis was Acute postoperative pain.      has a past medical history of Arthritis, Asthma, COVID-19, Dizzy, Hyperlipidemia, Hypertension, Infection of knee, Shingles, Syncope, and Vasovagal syndrome. has a past surgical history that includes knee surgery; Facial cosmetic surgery; external auditory canal reconstruction; [place] tooth extraction; and Cardiac surgery (07/05/2015). Restrictions  Restrictions/Precautions  Restrictions/Precautions: General Precautions, Up as Tolerated  Required Braces or Orthoses?: No  Position Activity Restriction  Other position/activity restrictions: L ONELIA, aqua cell dressing, polar care, LUE IV  Subjective   General  Response To Previous Treatment: Patient with no complaints from previous session. Family / Caregiver Present: No  Subjective  Subjective: Pt reporting feeling better, 5/10 VAS pain in hip primarily w/ movement. General Comment  Comments: RN and pt agreeable to therapy. Pt supine in bed upon arrival.  Pt pleasant and cooperative throughout. Orientation  Orientation  Overall Orientation Status: Within Functional Limits  Cognition   Cognition  Overall Cognitive Status: WFL  Safety Judgement: Decreased awareness of need for safety; Decreased awareness of need for assistance  Objective   Bed mobility  Supine to Sit: Minimal assistance  Sit to Supine:  (Not assessed this date, pt retired to bedside chair upon writer's exit.)  Scooting: Minimal assistance  Comment: Pt w/ mild difficulty throughout bed mobility this date, requiring increased time and effort to perform. Pt w/ heavy reliance on bedrails for UE assist throughout. Upon sitting EOB, pt reporting mild lightheadedness that subsided quickly. Transfers  Sit to Stand: Minimal Assistance; Contact guard assistance  Stand to sit: Contact guard assistance; Minimal Assistance  Comment: Pt performed 4 STS tranfers throughout session this date, requiring CGA-Maryan from writer throughout. Pt requiring MAX verbal cueing for proper hand placement throughout transfers w/ RW w/ fair return demo.   Pt w/ fair eccentric quad control noted throughout stand>sit transfers this date. Upon standing EOB, pt performed pre-gait lateral weight shifting and standing marches in order to assess stability prior to initiating ambulation w/ good steadiness noted. Ambulation  Ambulation?: Yes  More Ambulation?: No  Ambulation 1  Surface: level tile  Device: Rolling Walker  Assistance: Contact guard assistance  Quality of Gait: step to pattern  Gait Deviations: Slow Tyra  Distance: 50ft + 20ft  Comments: Pt ambulating w/ step-to gait pattern this date demsontrating fair steadiness throughout. Pt requiring min verbal cueing to maintain LESLIE within RW throughout w/ fair return demo. Stairs/Curb  Stairs?: Yes  Stairs  # Steps : 5  Stairs Height: 6\"  Rails: Bilateral  Device: No Device  Assistance: Contact guard assistance  Comment: Pt ascended/descended 5 stairs demonstrating good return of non-reciprocal stair pattern post-op. Pt w/ fair steadiness throughout. Balance  Posture: Good  Sitting - Static: Good  Sitting - Dynamic: Good  Standing - Static: Good; -  Standing - Dynamic: Fair; +  Comments: Standing balance assessed w/ RW  Exercises  Quad Sets: x 10  Heelslides: x 10  Knee Long Arc Quad: x 10  Ankle Pumps: x 15  Comments: Pt w/ good demo of pace and technique this date. AM-PAC Score  AM-PAC Inpatient Mobility Raw Score : 18 (12/03/21 0928)  AM-PAC Inpatient T-Scale Score : 43.63 (12/03/21 0928)  Mobility Inpatient CMS 0-100% Score: 46.58 (12/03/21 0928)  Mobility Inpatient CMS G-Code Modifier : CK (12/03/21 0928)       Functional Outcome Measure-   Single Leg Stance Test:  0 sec. (<5 sec.= fall risk)    Goals  Short term goals  Time Frame for Short term goals: 8 visits  Short term goal 1: Independent bed mobility  Short term goal 2:  Independent sit<>stand  Short term goal 3: Patient to ambulate 100 feet with roll walker SBA  Short term goal 4: Patient to ascend/descend 5 steps with 1 HR CGA    Plan    Plan  Times per week: BID; 7x/week  Current Treatment Recommendations: Strengthening, Transfer Training, Endurance Training, Patient/Caregiver Education & Training, Balance Training, Gait Training, Safety Education & Training, Stair training, Functional Mobility Training, Home Exercise Program  Safety Devices  Type of devices: Call light within reach, Gait belt, Nurse notified, Left in chair  Restraints  Initially in place: No     Therapy Time   Individual Concurrent Group Co-treatment   Time In 0840         Time Out 0922         Minutes [address] [place], [place]

## 2021-12-03 NOTE — PLAN OF CARE
Problem: Falls - Risk of:  Goal: Will remain free from falls  Description: Will remain free from falls  12/3/2021 0214 by Andreina Lester RN  Outcome: Ongoing  Note: Proper pt identification  Hourly rounding performed  Anticipatory needs met  Non-skid socks worn  Proper transferring technique  2/4 side rails up  Personal necessities within reach  Bed low and locked  Call light in reach  Proper lighting  Room free of clutter  Continue to monitor     Problem: Pain:  Goal: Control of acute pain  Description: Control of acute pain  12/3/2021 0214 by Andreina Lester RN  Outcome: Ongoing     Problem: DAILY CARE  Goal: Daily care needs are met  12/3/2021 0214 by Andreina Lester RN  Outcome: Ongoing

## 2021-12-06 VITALS
HEIGHT: 69 IN | WEIGHT: 222.22 LBS | BODY MASS INDEX: 32.91 KG/M2 | OXYGEN SATURATION: 94 % | HEART RATE: 62 BPM | TEMPERATURE: 98.1 F | SYSTOLIC BLOOD PRESSURE: 104 MMHG | DIASTOLIC BLOOD PRESSURE: 57 MMHG | RESPIRATION RATE: 18 BRPM

## 2021-12-07 NOTE — OP NOTE
Operative Note      Patient: Jh Michael  YOB: 1965  MRN: [id number]    Date of Procedure: 12/2/2021    Pre-Op Diagnosis: LEFT HIP OA    Post-Op Diagnosis: Same       Procedure(s):  LEFT HIP TOTAL ARTHROPLASTY ANTERIOR APPROACH - [facility]    Surgeon(s):  Bob Thompson MD    Assistant:   Surgical Assistant: Maribell Corral  Resident: Erasmo Carrera DO    Anesthesia: Regional    Estimated Blood Loss (mL): 162     Complications: None    Specimens:   * No specimens in log *    Implants:  Implant Name Type Inv. Item Serial No.  Lot No. LRB No. Used Action   G7 FINNED 4 HOLE SHELL 54F  G7 FINNED 4 HOLE SHELL 54F  [employer]On [id number] Left 1 Implanted   BONE SCREW 6.5X30 SELF-TAP  BONE SCREW 6.5X30 SELF-TAP  [employer]Turbo[employer]lations [id number] Left 1 Implanted   G7 NEUTRAL E1 LINER 36MM F  G7 NEUTRAL E1 LINER 36MM F  [employer] [id number] Left 1 Implanted   STEM FEM SZ 15 L115MM 133DEG HIP PPS HI OFFSET TYP 1 TAPR  STEM FEM SZ 15 L115MM 133DEG HIP PPS HI OFFSET TYP 1 TAPR  NAZANIN [employer] ORTHOPED[employer]-Turbo[employer]lations [id number] Left 1 Implanted   HEAD FEM [id number] STD NK HIP BIOLOX DELT CERAMIC  HEAD FEM [id number] STD NK HIP BIOLOX DELT CERAMIC  NAZANIN [employer]ET ORTHOPED[employer]-TurboTranslations [id number] Left 1 Implanted         Drains: * No LDAs found *    Findings: Severe degenerative joint disease    Detailed Description of Procedure:   Is a 61-year-old male with a longstanding history of left hip pain. The patient has known osteoarthritis involving the left hip. He is elected undergo an anterior supine intermuscular total hip arthroplasty. After informed consent was obtained the patient is brought to the operating room placed supine in the operating room table. A spinal anesthesia was placed. He was then positioned on the table for an ASI left total hip arthroplasty. Bovie pad was placed on his right buttock and the left leg was prepped out using a U drape.   The was cut and the right leg was then also draped out. Both legs were then prepared with a ChloraPrep solution after 3 minutes of drying time both legs were draped in sterile fashion. The skin overlying the anterior left hip was exposed and an Ioban drape was placed. X-ray was brought in and an x-ray of the left hip was obtained. Her incision was centered over the left hip 2 fingerbreadths inferior and 2 fingerbreadths lateral to the anterior superior iliac spine. A timeout was performed. After timeout was performed 10 cm incision was created over the anterior thigh and dissection was carried down to the level of the tensor fascia. The fascia was incised and dissection was carried down underneath the fascia  the muscle until we entered into the interval between the sartorius and tensor fascia. In this interval we are able to in counter the lateral femoral circumflex vessels. These were coagulated and the deep investing fascia of the IT band was then opened. The periacetabular fat pad was encountered. Fat pad is peeled off the anterior hip capsule and a #9 retractor was placed anteriorly. A #6 #7 retractors were then placed around the femoral neck. A T capsulectomy was performed. The retractors were then inserted intra-articularly and our femoral neck osteotomy was performed. A napkin ring osteotomy was created and that napkin ring was removed. A Steinmann pin was then placed into the head and removed. After the head was removed we placed retractors around the acetabulum and the acetabulum was exposed. Labrum was removed as well as our ligamentum teres and pulmonary tissue. We began reaming with a size 52 reamer. We reamed medial until we encountered the medial wall of the acetabulum. We had increased our reamer to a size 53 and reamed at approximately 40 degrees abduction with slight anteversion.   After we had adequate seating of our reamer we increased up to a size 54 and lightly reamed the acetabular cup to allow for a 54 G7 cup. The cup was impacted in appropriate position. X-ray was used to confirm adequate seating in position. After it was in place a 30 mm screw was inserted into the posterior superior quadrant giving us backup fixation of our cup. We then inserted a trial liner. The femur was then placed in a figure 4 position and the pedal femoral ligaments were peeled off the calcar the femur. Dissection was carried down to the level of the lesser trochanter where we are able to identify the lesser trochanter. A femoral neck length appeared to be of appropriate neck height. An Omni-Tract retractor was then placed underneath the femur and the table was placed in Trendelenburg and the foot of the table was dropped. The Omni-Tract retractor was placed on the side of the bed and the leg was placed in a figure 4 position. In this position the proximal femur was able to be adequately exposed. Capsular releases were performed superiorly. Once adequate exposure was obtained a cookie cutter device was used to gain lateral entry into the proximal femur. We then used a canal finder to find the femoral canal.  Then used a lateralizing reamer to get lateral entry into the femoral canal.  We began broaching first with a size 4 and we sequentially increased all the way up to a size 15. We initially trialed a size 15 high offset -3 neck length femur. Hip was reduced and her leg lengths felt to be a little bit short. We had good stability in this position. We then dislocated the hip and tried a standard size neck length. The standard high offset 15 stem gave us excellent stability and equal leg lengths. X-ray was taken confirming adequate size of our implant. The hip was then dislocated and the trial component was removed from the femur. We then remove the trial acetabular rim and impacted our final vitamin E impregnated polyethylene into the G7 cup.   Once that was impacted into place we placed our final size 15 high offset femur. Once this was impacted into place the 36 mm ceramic standard neck length ball was impacted onto our trunnion. The hip was then reduced and brought through full range of motion and found to be stable in all positions. Leg lengths were restored. The wound was thoroughly irrigated with a Betadine solution followed by a jet lavage. Our Exparel Marcaine solution was used to inject the soft tissues in the skin. The tensor fascia was closed with a running #2 Quill suture. Deep subcutaneous tissues were closed using an 0 Vicryl followed by a running 2 OV lock suture. The skin was run with a 3-0 Monocryl suture and skin glue. An Aquasol dressing was placed on the skin and the patient was transported to recovery in stable condition.   Electronically signed by Yoselin Lui MD on 12/7/2021 at 10:34 AM

## (undated) DEVICE — APPLICATOR MEDICATED 26 CC SOLUTION HI LT ORNG CHLORAPREP

## (undated) DEVICE — 2108 SERIES SAGITTAL BLADE, NO OFFSET  (18.6 X 1.24 X 105MM)

## (undated) DEVICE — PADDING CAST W6INXL4YD POLY POR SPUN DACRON SYN VERSATILE

## (undated) DEVICE — GLOVE SURG SZ 8 L12IN FNGR THK79MIL GRN LTX FREE

## (undated) DEVICE — GLOVE SURG SZ 75 CRM LTX FREE POLYISOPRENE POLYMER BEAD ANTI

## (undated) DEVICE — KIT EVAC 0.13IN RECT TB DIA10FR 400CC PVC 3 SPR Y CONN DRN

## (undated) DEVICE — STOCKINETTE,IMPERVIOUS,12X48,STERILE: Brand: MEDLINE

## (undated) DEVICE — BNDG,ELSTC,MATRIX,STRL,6"X5YD,LF,HOOK&LP: Brand: MEDLINE

## (undated) DEVICE — C-ARM: Brand: UNBRANDED

## (undated) DEVICE — DRAPE,U/ SHT,SPLIT,PLAS,STERIL: Brand: MEDLINE

## (undated) DEVICE — ADHESIVE SKIN CLSR 0.7ML TOP DERMBND ADV

## (undated) DEVICE — SUTURE MCRYL SZ 3-0 L27IN ABSRB UD PS-2 3/8 CIR REV CUT NDL MCP427H

## (undated) DEVICE — SOLUTION IRRIG 3000ML 0.9% SOD CHL USP UROMATIC PLAS CONT

## (undated) DEVICE — MASTISOL ADHESIVE LIQ 2/3ML

## (undated) DEVICE — SYRINGE MED 30ML STD CLR PLAS LUERLOCK TIP N CTRL DISP

## (undated) DEVICE — ZIPPERED TOGA, 2X LARGE: Brand: FLYTE, SURGICOOL

## (undated) DEVICE — SHEET, ORTHO, SPLIT, STERILE: Brand: MEDLINE

## (undated) DEVICE — SUTURE VCRL SZ 0 L27IN ABSRB UD L36MM CP-1 1/2 CIR REV CUT J267H

## (undated) DEVICE — BANDAGE COBAN 6 IN WND 6INX5YD FOAM

## (undated) DEVICE — Z DISCONTINUED PER MEDLINE USE 2741943 DRESSING AQUACEL 10 IN ALG W9XL25CM SIL CVR WTRPRF VIR BACT BARR ANTIMIC

## (undated) DEVICE — COVER,MAYO STAND,XL,STERILE: Brand: MEDLINE

## (undated) DEVICE — DRAPE,T,LIMB,BILATERAL,STERILE: Brand: MEDLINE

## (undated) DEVICE — STOCKINETTE,DOUBLE PLY,4X48,STERILE: Brand: MEDLINE

## (undated) DEVICE — SYRINGE 20ML LL S/C 50

## (undated) DEVICE — SUTURE STRATAFIX SYMMETRIC PDS + SZ 1 L18IN ABSRB VLT L48MM SXPP1A400

## (undated) DEVICE — SUTURE 2-0 STRATAFIX MONOCRYL 45CM CT-1

## (undated) DEVICE — Device

## (undated) DEVICE — GAUZE,SPONGE,FLUFF,6"X6.75",STRL,5/TRAY: Brand: MEDLINE

## (undated) DEVICE — DRAPE,EXTREMITY,BILATERAL,ORTHOMAX: Brand: MEDLINE

## (undated) DEVICE — NEEDLE SPNL 18GA L3.5IN W/ QNCKE SHARPER BVL DURA CLICK

## (undated) DEVICE — BLANKET WRM W29.9XL79.1IN UP BODY FORC AIR MISTRAL-AIR

## (undated) DEVICE — NEEDLE SPINAL 22GA L3.5IN SPINOCAN

## (undated) DEVICE — GOWN,AURORA,NONRNF,XL,30/CS: Brand: MEDLINE

## (undated) DEVICE — 3M™ IOBAN™ 2 ANTIMICROBIAL INCISE DRAPE 6650EZ: Brand: IOBAN™ 2

## (undated) DEVICE — 3M™ IOBAN™ 2 ANTIMICROBIAL INCISE DRAPE 6651EZ: Brand: IOBAN™ 2

## (undated) DEVICE — DRAPE,REIN 53X77,STERILE: Brand: MEDLINE

## (undated) DEVICE — 3M™ STERI-STRIP™ COMPOUND BENZOIN TINCTURE 40 BAGS/CARTON 4 CARTONS/CASE C1544: Brand: 3M™ STERI-STRIP™